# Patient Record
Sex: FEMALE | Race: OTHER | NOT HISPANIC OR LATINO | Employment: UNEMPLOYED | URBAN - METROPOLITAN AREA
[De-identification: names, ages, dates, MRNs, and addresses within clinical notes are randomized per-mention and may not be internally consistent; named-entity substitution may affect disease eponyms.]

---

## 2018-08-02 LAB
EXTERNAL ABO GROUPING: NORMAL
EXTERNAL ANTIBODY SCREEN: NEGATIVE
EXTERNAL HEPATITIS B SURFACE ANTIGEN: NEGATIVE
EXTERNAL HEPATITIS C AB: NORMAL
EXTERNAL RH FACTOR: POSITIVE
EXTERNAL RUBELLA QUALITATIVE: NORMAL
EXTERNAL SYPHILIS RPR SCREEN: NORMAL
HIV1 P24 AG SERPL QL IA: NORMAL

## 2018-12-18 LAB — EXTERNAL GTT 1 HOUR: 134

## 2019-02-19 LAB — EXTERNAL GROUP B STREP ANTIGEN: NEGATIVE

## 2019-03-08 ENCOUNTER — ANESTHESIA (OUTPATIENT)
Dept: LABOR AND DELIVERY | Facility: HOSPITAL | Age: 29
End: 2019-03-08

## 2019-03-08 ENCOUNTER — HOSPITAL ENCOUNTER (INPATIENT)
Facility: HOSPITAL | Age: 29
LOS: 3 days | Discharge: HOME | End: 2019-03-11
Attending: OBSTETRICS & GYNECOLOGY | Admitting: OBSTETRICS & GYNECOLOGY

## 2019-03-08 ENCOUNTER — ANESTHESIA EVENT (OUTPATIENT)
Dept: LABOR AND DELIVERY | Facility: HOSPITAL | Age: 29
End: 2019-03-08

## 2019-03-08 PROBLEM — Z34.90 PREGNANCY: Status: ACTIVE | Noted: 2019-03-08

## 2019-03-08 LAB
ABO GROUP BLD: NORMAL
BASOPHILS # BLD AUTO: 0.03 10*3/MM3 (ref 0–0.2)
BASOPHILS NFR BLD AUTO: 0.2 % (ref 0–1.5)
BLD GP AB SCN SERPL QL: NEGATIVE
DEPRECATED RDW RBC AUTO: 44.4 FL (ref 37–54)
EOSINOPHIL # BLD AUTO: 0.03 10*3/MM3 (ref 0–0.4)
EOSINOPHIL NFR BLD AUTO: 0.2 % (ref 0.3–6.2)
ERYTHROCYTE [DISTWIDTH] IN BLOOD BY AUTOMATED COUNT: 12 % (ref 12.3–15.4)
HCT VFR BLD AUTO: 41.2 % (ref 34–46.6)
HGB BLD-MCNC: 13.4 G/DL (ref 12–15.9)
IMM GRANULOCYTES # BLD AUTO: 0.06 10*3/MM3 (ref 0–0.05)
IMM GRANULOCYTES NFR BLD AUTO: 0.5 % (ref 0–0.5)
LYMPHOCYTES # BLD AUTO: 1.82 10*3/MM3 (ref 0.7–3.1)
LYMPHOCYTES NFR BLD AUTO: 14.1 % (ref 19.6–45.3)
MCH RBC QN AUTO: 32.8 PG (ref 26.6–33)
MCHC RBC AUTO-ENTMCNC: 32.5 G/DL (ref 31.5–35.7)
MCV RBC AUTO: 101 FL (ref 79–97)
MONOCYTES # BLD AUTO: 0.64 10*3/MM3 (ref 0.1–0.9)
MONOCYTES NFR BLD AUTO: 5 % (ref 5–12)
NEUTROPHILS # BLD AUTO: 10.32 10*3/MM3 (ref 1.4–7)
NEUTROPHILS NFR BLD AUTO: 80 % (ref 42.7–76)
NRBC BLD AUTO-RTO: 0 /100 WBC (ref 0–0)
PLATELET # BLD AUTO: 198 10*3/MM3 (ref 140–450)
PMV BLD AUTO: 10.5 FL (ref 6–12)
RBC # BLD AUTO: 4.08 10*6/MM3 (ref 3.77–5.28)
RH BLD: POSITIVE
T&S EXPIRATION DATE: NORMAL
WBC NRBC COR # BLD: 12.9 10*3/MM3 (ref 3.4–10.8)

## 2019-03-08 PROCEDURE — 86901 BLOOD TYPING SEROLOGIC RH(D): CPT | Performed by: OBSTETRICS & GYNECOLOGY

## 2019-03-08 PROCEDURE — 86850 RBC ANTIBODY SCREEN: CPT | Performed by: OBSTETRICS & GYNECOLOGY

## 2019-03-08 PROCEDURE — 85025 COMPLETE CBC W/AUTO DIFF WBC: CPT | Performed by: OBSTETRICS & GYNECOLOGY

## 2019-03-08 PROCEDURE — 86900 BLOOD TYPING SEROLOGIC ABO: CPT | Performed by: OBSTETRICS & GYNECOLOGY

## 2019-03-08 PROCEDURE — C1755 CATHETER, INTRASPINAL: HCPCS | Performed by: ANESTHESIOLOGY

## 2019-03-08 RX ORDER — LIDOCAINE HYDROCHLORIDE 10 MG/ML
INJECTION, SOLUTION INFILTRATION; PERINEURAL AS NEEDED
Status: DISCONTINUED | OUTPATIENT
Start: 2019-03-08 | End: 2019-03-09 | Stop reason: SURG

## 2019-03-08 RX ORDER — ONDANSETRON 4 MG/1
4 TABLET, FILM COATED ORAL EVERY 8 HOURS PRN
Status: DISCONTINUED | OUTPATIENT
Start: 2019-03-08 | End: 2019-03-09 | Stop reason: HOSPADM

## 2019-03-08 RX ORDER — FAMOTIDINE 10 MG/ML
20 INJECTION, SOLUTION INTRAVENOUS EVERY 12 HOURS PRN
Status: DISCONTINUED | OUTPATIENT
Start: 2019-03-08 | End: 2019-03-09 | Stop reason: HOSPADM

## 2019-03-08 RX ORDER — ONDANSETRON 2 MG/ML
4 INJECTION INTRAMUSCULAR; INTRAVENOUS EVERY 6 HOURS PRN
Status: DISCONTINUED | OUTPATIENT
Start: 2019-03-08 | End: 2019-03-09 | Stop reason: HOSPADM

## 2019-03-08 RX ORDER — ACETAMINOPHEN 325 MG/1
650 TABLET ORAL EVERY 4 HOURS PRN
Status: DISCONTINUED | OUTPATIENT
Start: 2019-03-08 | End: 2019-03-09 | Stop reason: HOSPADM

## 2019-03-08 RX ORDER — SODIUM CHLORIDE 0.9 % (FLUSH) 0.9 %
1-10 SYRINGE (ML) INJECTION AS NEEDED
Status: DISCONTINUED | OUTPATIENT
Start: 2019-03-08 | End: 2019-03-09 | Stop reason: HOSPADM

## 2019-03-08 RX ORDER — TERBUTALINE SULFATE 1 MG/ML
0.25 INJECTION, SOLUTION SUBCUTANEOUS AS NEEDED
Status: DISCONTINUED | OUTPATIENT
Start: 2019-03-08 | End: 2019-03-09 | Stop reason: HOSPADM

## 2019-03-08 RX ORDER — LIDOCAINE HYDROCHLORIDE 10 MG/ML
5 INJECTION, SOLUTION EPIDURAL; INFILTRATION; INTRACAUDAL; PERINEURAL AS NEEDED
Status: DISCONTINUED | OUTPATIENT
Start: 2019-03-08 | End: 2019-03-09 | Stop reason: HOSPADM

## 2019-03-08 RX ORDER — ONDANSETRON 2 MG/ML
4 INJECTION INTRAMUSCULAR; INTRAVENOUS ONCE AS NEEDED
Status: DISCONTINUED | OUTPATIENT
Start: 2019-03-08 | End: 2019-03-09 | Stop reason: HOSPADM

## 2019-03-08 RX ORDER — DIPHENHYDRAMINE HYDROCHLORIDE 50 MG/ML
12.5 INJECTION INTRAMUSCULAR; INTRAVENOUS EVERY 8 HOURS PRN
Status: DISCONTINUED | OUTPATIENT
Start: 2019-03-08 | End: 2019-03-09 | Stop reason: HOSPADM

## 2019-03-08 RX ORDER — ACETAMINOPHEN 500 MG
1000 TABLET ORAL ONCE
Status: DISCONTINUED | OUTPATIENT
Start: 2019-03-08 | End: 2019-03-08

## 2019-03-08 RX ORDER — PRENATAL VIT/IRON FUM/FOLIC AC 27MG-0.8MG
TABLET ORAL DAILY
COMMUNITY
End: 2020-03-10

## 2019-03-08 RX ORDER — ONDANSETRON 4 MG/1
4 TABLET, ORALLY DISINTEGRATING ORAL EVERY 6 HOURS PRN
Status: DISCONTINUED | OUTPATIENT
Start: 2019-03-08 | End: 2019-03-09 | Stop reason: HOSPADM

## 2019-03-08 RX ORDER — LIDOCAINE HYDROCHLORIDE AND EPINEPHRINE 15; 5 MG/ML; UG/ML
INJECTION, SOLUTION EPIDURAL AS NEEDED
Status: DISCONTINUED | OUTPATIENT
Start: 2019-03-08 | End: 2019-03-09 | Stop reason: SURG

## 2019-03-08 RX ORDER — ZOLPIDEM TARTRATE 5 MG/1
5 TABLET ORAL NIGHTLY PRN
Status: DISCONTINUED | OUTPATIENT
Start: 2019-03-08 | End: 2019-03-09 | Stop reason: HOSPADM

## 2019-03-08 RX ORDER — BUTORPHANOL TARTRATE 1 MG/ML
1 INJECTION, SOLUTION INTRAMUSCULAR; INTRAVENOUS
Status: DISCONTINUED | OUTPATIENT
Start: 2019-03-08 | End: 2019-03-09 | Stop reason: HOSPADM

## 2019-03-08 RX ORDER — SODIUM CHLORIDE, SODIUM LACTATE, POTASSIUM CHLORIDE, CALCIUM CHLORIDE 600; 310; 30; 20 MG/100ML; MG/100ML; MG/100ML; MG/100ML
125 INJECTION, SOLUTION INTRAVENOUS CONTINUOUS
Status: DISCONTINUED | OUTPATIENT
Start: 2019-03-08 | End: 2019-03-09

## 2019-03-08 RX ORDER — FAMOTIDINE 20 MG/1
20 TABLET, FILM COATED ORAL EVERY 12 HOURS PRN
Status: DISCONTINUED | OUTPATIENT
Start: 2019-03-08 | End: 2019-03-09 | Stop reason: HOSPADM

## 2019-03-08 RX ORDER — EPHEDRINE SULFATE 50 MG/ML
5 INJECTION, SOLUTION INTRAVENOUS AS NEEDED
Status: DISCONTINUED | OUTPATIENT
Start: 2019-03-08 | End: 2019-03-09 | Stop reason: HOSPADM

## 2019-03-08 RX ADMIN — ROPIVACAINE HYDROCHLORIDE 8 ML/HR: 2 INJECTION, SOLUTION EPIDURAL; INFILTRATION at 22:48

## 2019-03-08 RX ADMIN — SODIUM CHLORIDE, POTASSIUM CHLORIDE, SODIUM LACTATE AND CALCIUM CHLORIDE 1000 ML: 600; 310; 30; 20 INJECTION, SOLUTION INTRAVENOUS at 21:00

## 2019-03-08 RX ADMIN — LIDOCAINE HYDROCHLORIDE AND EPINEPHRINE 3 ML: 15; 5 INJECTION, SOLUTION EPIDURAL at 22:42

## 2019-03-08 RX ADMIN — SODIUM CHLORIDE, POTASSIUM CHLORIDE, SODIUM LACTATE AND CALCIUM CHLORIDE 125 ML/HR: 600; 310; 30; 20 INJECTION, SOLUTION INTRAVENOUS at 22:06

## 2019-03-08 RX ADMIN — LIDOCAINE HYDROCHLORIDE 4 ML: 10 INJECTION, SOLUTION INFILTRATION; PERINEURAL at 22:46

## 2019-03-08 NOTE — NURSING NOTE
Dr. Figueroa given update on patient's status. Small change in effacement patient rates pain 8/10. Bulging bag of water. Pt will walk around unit and is to be rechecked In a few hours.

## 2019-03-08 NOTE — NURSING NOTE
Spoke to Dr. Figueroa about patient. Strip reviewed, patient alize irregularly and rating pain 3/10. Bloody show noted on glove after SVE. SVE 1/70/-2 and posterior.  MD states to recheck patient in hour and to reassess.

## 2019-03-09 LAB
ATMOSPHERIC PRESS: 751.1 MMHG
BASE EXCESS BLDCOV CALC-SCNC: -3.8 MMOL/L (ref -30–30)
BDY SITE: ABNORMAL
HCO3 BLDCOV-SCNC: 21.8 MMOL/L
MODALITY: ABNORMAL
NOTE: ABNORMAL
PCO2 BLDCOV: 40.7 MM HG (ref 35–51.3)
PH BLDCOV: 7.34 PH UNITS (ref 7.26–7.4)
PO2 BLDCOV: 30 MM HG (ref 19–39)
SAO2 % BLDCOA: 53.4 % (ref 92–99)
SAO2 % BLDCOV: ABNORMAL %

## 2019-03-09 PROCEDURE — 0KQM0ZZ REPAIR PERINEUM MUSCLE, OPEN APPROACH: ICD-10-PCS | Performed by: OBSTETRICS & GYNECOLOGY

## 2019-03-09 PROCEDURE — 82803 BLOOD GASES ANY COMBINATION: CPT

## 2019-03-09 PROCEDURE — 25010000002 ROPIVACAINE PER 1 MG: Performed by: ANESTHESIOLOGY

## 2019-03-09 PROCEDURE — C1755 CATHETER, INTRASPINAL: HCPCS

## 2019-03-09 PROCEDURE — 25010000002 FENTANYL CITRATE (PF) 2500 MCG/50ML SOLUTION: Performed by: ANESTHESIOLOGY

## 2019-03-09 RX ORDER — ONDANSETRON 4 MG/1
4 TABLET, FILM COATED ORAL EVERY 8 HOURS PRN
Status: DISCONTINUED | OUTPATIENT
Start: 2019-03-09 | End: 2019-03-11 | Stop reason: HOSPADM

## 2019-03-09 RX ORDER — METHYLERGONOVINE MALEATE 0.2 MG/ML
200 INJECTION INTRAVENOUS ONCE AS NEEDED
Status: DISCONTINUED | OUTPATIENT
Start: 2019-03-09 | End: 2019-03-09 | Stop reason: HOSPADM

## 2019-03-09 RX ORDER — MISOPROSTOL 200 UG/1
600 TABLET ORAL ONCE AS NEEDED
Status: DISCONTINUED | OUTPATIENT
Start: 2019-03-09 | End: 2019-03-11 | Stop reason: HOSPADM

## 2019-03-09 RX ORDER — MINERAL OIL
OIL (ML) MISCELLANEOUS AS NEEDED
Status: DISCONTINUED | OUTPATIENT
Start: 2019-03-09 | End: 2019-03-11 | Stop reason: HOSPADM

## 2019-03-09 RX ORDER — CARBOPROST TROMETHAMINE 250 UG/ML
250 INJECTION, SOLUTION INTRAMUSCULAR AS NEEDED
Status: DISCONTINUED | OUTPATIENT
Start: 2019-03-09 | End: 2019-03-09 | Stop reason: HOSPADM

## 2019-03-09 RX ORDER — PHYTONADIONE 1 MG/.5ML
INJECTION, EMULSION INTRAMUSCULAR; INTRAVENOUS; SUBCUTANEOUS
Status: DISCONTINUED
Start: 2019-03-09 | End: 2019-03-09 | Stop reason: WASHOUT

## 2019-03-09 RX ORDER — ONDANSETRON 4 MG/1
4 TABLET, ORALLY DISINTEGRATING ORAL EVERY 6 HOURS PRN
Status: DISCONTINUED | OUTPATIENT
Start: 2019-03-09 | End: 2019-03-11 | Stop reason: HOSPADM

## 2019-03-09 RX ORDER — PRENATAL VIT NO.126/IRON/FOLIC 28MG-0.8MG
1 TABLET ORAL DAILY
Status: DISCONTINUED | OUTPATIENT
Start: 2019-03-09 | End: 2019-03-11 | Stop reason: HOSPADM

## 2019-03-09 RX ORDER — OXYCODONE AND ACETAMINOPHEN 10; 325 MG/1; MG/1
1 TABLET ORAL EVERY 4 HOURS PRN
Status: DISCONTINUED | OUTPATIENT
Start: 2019-03-09 | End: 2019-03-11 | Stop reason: HOSPADM

## 2019-03-09 RX ORDER — ONDANSETRON 2 MG/ML
4 INJECTION INTRAMUSCULAR; INTRAVENOUS EVERY 6 HOURS PRN
Status: DISCONTINUED | OUTPATIENT
Start: 2019-03-09 | End: 2019-03-11 | Stop reason: HOSPADM

## 2019-03-09 RX ORDER — ONDANSETRON 4 MG/1
4 TABLET, ORALLY DISINTEGRATING ORAL EVERY 8 HOURS PRN
Status: DISCONTINUED | OUTPATIENT
Start: 2019-03-09 | End: 2019-03-11 | Stop reason: HOSPADM

## 2019-03-09 RX ORDER — ACETAMINOPHEN 650 MG/1
650 SUPPOSITORY RECTAL ONCE
Status: COMPLETED | OUTPATIENT
Start: 2019-03-09 | End: 2019-03-09

## 2019-03-09 RX ORDER — IBUPROFEN 800 MG/1
800 TABLET ORAL EVERY 8 HOURS PRN
Status: DISCONTINUED | OUTPATIENT
Start: 2019-03-09 | End: 2019-03-11 | Stop reason: HOSPADM

## 2019-03-09 RX ORDER — PROMETHAZINE HYDROCHLORIDE 25 MG/ML
12.5 INJECTION, SOLUTION INTRAMUSCULAR; INTRAVENOUS EVERY 6 HOURS PRN
Status: DISCONTINUED | OUTPATIENT
Start: 2019-03-09 | End: 2019-03-11 | Stop reason: HOSPADM

## 2019-03-09 RX ORDER — ERYTHROMYCIN 5 MG/G
OINTMENT OPHTHALMIC
Status: DISPENSED
Start: 2019-03-09 | End: 2019-03-09

## 2019-03-09 RX ORDER — OXYCODONE HYDROCHLORIDE AND ACETAMINOPHEN 5; 325 MG/1; MG/1
2 TABLET ORAL EVERY 4 HOURS PRN
Status: DISCONTINUED | OUTPATIENT
Start: 2019-03-09 | End: 2019-03-11 | Stop reason: HOSPADM

## 2019-03-09 RX ORDER — ACETAMINOPHEN 650 MG/1
325 SUPPOSITORY RECTAL ONCE
Status: COMPLETED | OUTPATIENT
Start: 2019-03-09 | End: 2019-03-09

## 2019-03-09 RX ORDER — IBUPROFEN 800 MG/1
800 TABLET ORAL EVERY 8 HOURS PRN
Status: DISCONTINUED | OUTPATIENT
Start: 2019-03-09 | End: 2019-03-09 | Stop reason: HOSPADM

## 2019-03-09 RX ORDER — ONDANSETRON 2 MG/ML
4 INJECTION INTRAMUSCULAR; INTRAVENOUS EVERY 8 HOURS PRN
Status: DISCONTINUED | OUTPATIENT
Start: 2019-03-09 | End: 2019-03-11 | Stop reason: HOSPADM

## 2019-03-09 RX ORDER — HYDROMORPHONE HYDROCHLORIDE 1 MG/ML
0.5 INJECTION, SOLUTION INTRAMUSCULAR; INTRAVENOUS; SUBCUTANEOUS
Status: DISCONTINUED | OUTPATIENT
Start: 2019-03-09 | End: 2019-03-11 | Stop reason: HOSPADM

## 2019-03-09 RX ORDER — OXYCODONE HYDROCHLORIDE AND ACETAMINOPHEN 5; 325 MG/1; MG/1
1 TABLET ORAL EVERY 4 HOURS PRN
Status: DISCONTINUED | OUTPATIENT
Start: 2019-03-09 | End: 2019-03-11 | Stop reason: HOSPADM

## 2019-03-09 RX ORDER — CALCIUM CARBONATE 200(500)MG
2 TABLET,CHEWABLE ORAL 3 TIMES DAILY PRN
Status: DISCONTINUED | OUTPATIENT
Start: 2019-03-09 | End: 2019-03-11 | Stop reason: HOSPADM

## 2019-03-09 RX ORDER — OXYTOCIN-SODIUM CHLORIDE 0.9% IV SOLN 30 UNIT/500ML 30-0.9/5 UT/ML-%
250 SOLUTION INTRAVENOUS CONTINUOUS
Status: ACTIVE | OUTPATIENT
Start: 2019-03-09 | End: 2019-03-09

## 2019-03-09 RX ORDER — PROMETHAZINE HYDROCHLORIDE 25 MG/1
12.5 SUPPOSITORY RECTAL EVERY 6 HOURS PRN
Status: DISCONTINUED | OUTPATIENT
Start: 2019-03-09 | End: 2019-03-11 | Stop reason: HOSPADM

## 2019-03-09 RX ORDER — LANOLIN
CREAM (ML) TOPICAL
Status: DISCONTINUED | OUTPATIENT
Start: 2019-03-09 | End: 2019-03-11 | Stop reason: HOSPADM

## 2019-03-09 RX ORDER — MISOPROSTOL 200 UG/1
800 TABLET ORAL AS NEEDED
Status: DISCONTINUED | OUTPATIENT
Start: 2019-03-09 | End: 2019-03-09 | Stop reason: HOSPADM

## 2019-03-09 RX ORDER — PROMETHAZINE HYDROCHLORIDE 25 MG/1
25 TABLET ORAL EVERY 6 HOURS PRN
Status: DISCONTINUED | OUTPATIENT
Start: 2019-03-09 | End: 2019-03-11 | Stop reason: HOSPADM

## 2019-03-09 RX ORDER — PHYTONADIONE 1 MG/.5ML
INJECTION, EMULSION INTRAMUSCULAR; INTRAVENOUS; SUBCUTANEOUS
Status: DISPENSED
Start: 2019-03-09 | End: 2019-03-09

## 2019-03-09 RX ORDER — OXYTOCIN-SODIUM CHLORIDE 0.9% IV SOLN 30 UNIT/500ML 30-0.9/5 UT/ML-%
999 SOLUTION INTRAVENOUS ONCE
Status: COMPLETED | OUTPATIENT
Start: 2019-03-09 | End: 2019-03-09

## 2019-03-09 RX ORDER — ERYTHROMYCIN 5 MG/G
OINTMENT OPHTHALMIC
Status: DISCONTINUED
Start: 2019-03-09 | End: 2019-03-09 | Stop reason: WASHOUT

## 2019-03-09 RX ORDER — OXYTOCIN-SODIUM CHLORIDE 0.9% IV SOLN 30 UNIT/500ML 30-0.9/5 UT/ML-%
125 SOLUTION INTRAVENOUS CONTINUOUS PRN
Status: COMPLETED | OUTPATIENT
Start: 2019-03-09 | End: 2019-03-09

## 2019-03-09 RX ORDER — DOCUSATE SODIUM 100 MG/1
100 CAPSULE, LIQUID FILLED ORAL 2 TIMES DAILY
Status: DISCONTINUED | OUTPATIENT
Start: 2019-03-09 | End: 2019-03-11 | Stop reason: HOSPADM

## 2019-03-09 RX ADMIN — SODIUM CHLORIDE, POTASSIUM CHLORIDE, SODIUM LACTATE AND CALCIUM CHLORIDE 125 ML/HR: 600; 310; 30; 20 INJECTION, SOLUTION INTRAVENOUS at 02:20

## 2019-03-09 RX ADMIN — OXYTOCIN 125 ML/HR: 10 INJECTION INTRAVENOUS at 11:36

## 2019-03-09 RX ADMIN — ACETAMINOPHEN 650 MG: 650 SUPPOSITORY RECTAL at 08:18

## 2019-03-09 RX ADMIN — OXYTOCIN 999 ML/HR: 10 INJECTION INTRAVENOUS at 10:09

## 2019-03-09 RX ADMIN — ROPIVACAINE HYDROCHLORIDE 8 ML/HR: 2 INJECTION, SOLUTION EPIDURAL; INFILTRATION at 07:03

## 2019-03-09 RX ADMIN — ACETAMINOPHEN 325 MG: 650 SUPPOSITORY RECTAL at 08:19

## 2019-03-09 RX ADMIN — Medication: at 14:47

## 2019-03-09 RX ADMIN — IBUPROFEN 800 MG: 800 TABLET, FILM COATED ORAL at 14:47

## 2019-03-09 RX ADMIN — OXYCODONE AND ACETAMINOPHEN 2 TABLET: 5; 325 TABLET ORAL at 23:28

## 2019-03-09 RX ADMIN — DOCUSATE SODIUM 100 MG: 100 CAPSULE, LIQUID FILLED ORAL at 19:49

## 2019-03-09 RX ADMIN — SODIUM CHLORIDE, POTASSIUM CHLORIDE, SODIUM LACTATE AND CALCIUM CHLORIDE 125 ML/HR: 600; 310; 30; 20 INJECTION, SOLUTION INTRAVENOUS at 09:22

## 2019-03-09 RX ADMIN — OXYCODONE AND ACETAMINOPHEN 1 TABLET: 5; 325 TABLET ORAL at 19:49

## 2019-03-09 RX ADMIN — IBUPROFEN 800 MG: 800 TABLET, FILM COATED ORAL at 23:28

## 2019-03-09 NOTE — LACTATION NOTE
P1. Term infant.  Pt anxious about holding infant and feels most comfortable in cradle hold.  Infant eager and grasps breast easily.  Reviewed difference between shallow and deep latch and instructed pt to relatch infant when shallow latch achieved.  Reviewed how to know infant getting enough, diet, and feeding patterns.  Pt to call LC as needed.    Lactation Consult Note    Evaluation Completed: 3/9/2019 3:43 PM  Patient Name: Kamron Delgado  :  1990  MRN:  [id number]     REFERRAL  INFORMATION:                          Date of Referral: 19   Person Making Referral: patient  Maternal Reason for Referral: breastfeeding currently       DELIVERY HISTORY:          Skin to skin initiation date/time: 3/9/2019  10:14 AM   Skin to skin end date/time:              MATERNAL ASSESSMENT:  Breast Size Issue: none (19 : Yaz Barrios, RN)  Breast Shape: Bilateral:, round (19 : Yaz Barrios, RN)  Breast Density: Bilateral:, soft (19 : Yaz Barrios, RN)  Areola: Bilateral:, elastic (19 : Yaz Barrios, RN)  Nipples: Bilateral:, everted (19 : Yaz Barrios, RN)                INFANT ASSESSMENT:  Information for the patient's :  Ross Delgado [[id number]]   No past medical history on file.    Feeding Readiness Cues: eager (19 : Yaz Barrios, RN)   Feeding Method: breastfeeding (19 : Yaz Barrios, RN)   Feeding Tolerance/Success: coordinated suck, coordinated swallow, adequate pause for breath, alert for feeding (19 : Yaz Barrios, RN)       Satiety Cues: calm after feeding (19 : Yaz Barrios, RN)           Feeding Interventions: lips stroked, latch assistance provided (19 : Yaz Barrios, RN)   Nutrition Interventions: lactation consult initiated (19 : Yaz Barrios, RN)   Additional Documentation: LATCH Score (Group) (19 : Yaz Barrios, RN)            Breastfeeding: breastfeeding, bilateral (19 : Yaz Barrios RN)   Infant Positioning: cradle (19 : Yaz Barrios, RN)   Breastfeeding Time, Left (min): 5 (19 : Yaz Barrios RN)       Effective Latch During Feeding: yes (19 : Yaz Barrios RN)   Suck/Swallow Coordination: present (19 : Yaz Barrios RN)   Signs of Milk Transfer: infant jaw motion present (19 : Yaz Barrios RN)       Latch: 2-->grasps breast, tongue down, lips flanged, rhythmic sucking (19 : Yaz Barrios RN)   Audible Swallowin-->a few with stimulation (19 : Yaz Barrios RN)   Type of Nipple: 2-->everted (after stimulation) (19 : Yaz Barrios RN)   Comfort (Breast/Nipple): 2-->soft/nontender (19 : Yaz Barrios, RN)   Hold (Positioning): 1-->minimal assist, teach one side, mother does other, staff holds (19 : Yaz Barrios, RN)   Latch Score: 8 (19 : Yaz Barrios, RN)     Infant-Driven Feeding Scales - Readiness: Alert or fussy prior to care. Rooting and/or hands to mouth behavior. Good tone. (19 : Yaz Barrios, RN)   Infant-Driven Feeding Scales - Quality: Nipples with a strong coordinated SSB but fatigues with progression. (19 : Yaz Barrios, SHARON)             MATERNAL INFANT FEEDING:  Maternal Preparation: breast care (19 : Yaz Barrios, SHARON)  Maternal Emotional State: anxious (19 : Yaz Barrios, RN)  Infant Positioning: cradle (19 : Yaz Barrios, RN)   Signs of Milk Transfer: infant jaw motion present (19 : Yaz Barrios, RN)  Pain with Feeding: no (19 : Yaz Barrios, RN)        Comfort Measures Before/During Feeding: infant position adjusted, latch adjusted (19 : Yaz Barrios, RN)  Milk Ejection Reflex: present (19 : Yaz Barrios, SHARON)  Comfort Measures Following Feeding:  expressed milk applied, air-drying encouraged (03/09/19 1539 : Yaz Barrios, RN)        Latch Assistance: yes (03/09/19 1539 : Yaz Barrios, RN)                               EQUIPMENT TYPE:             Breast Care: Breastfeeding: breast milk to nipples, milk massaged towards nipple, open to air (03/09/19 1540 : Yaz Barrios, RN)  Breastfeeding Assistance: assisted with positioning, feeding on demand promoted, infant latch-on verified, support offered, infant stimulated to wakeful state, feeding session observed, feeding cue recognition promoted (03/09/19 1540 : Yaz Barrios, RN)     Breastfeeding Support: encouragement provided, lactation counseling provided, maternal nutrition promoted, maternal rest encouraged, maternal hydration promoted, infant-mother separation minimized (03/09/19 1540 : Yaz Barrios, RN)          BREAST PUMPING:          LACTATION REFERRALS:

## 2019-03-09 NOTE — PLAN OF CARE
Problem: Patient Care Overview  Goal: Plan of Care Review  Outcome: Ongoing (interventions implemented as appropriate)   19   Coping/Psychosocial   Plan of Care Reviewed With patient;spouse;other (see comments)  (mother-in-law)   Plan of Care Review   Progress no change     Goal: Individualization and Mutuality  Outcome: Ongoing (interventions implemented as appropriate)   19   Individualization   Patient Specific Preferences KRISHNA and BF in OBRR   Patient Specific Goals (Include Timeframe) vaginal delivery   Patient Specific Interventions pt wants to be kept informed of progress, interventions, does not want any surprises   Mutuality/Individual Preferences   What Anxieties, Fears, Concerns, or Questions Do You Have About Your Care?  section   Mutuality/Individual Preferences   How to Address Anxieties/Fears keep informed of progress and anticipated needs/changes to plan of care`     Goal: Discharge Needs Assessment  Outcome: Ongoing (interventions implemented as appropriate)   19   Discharge Needs Assessment   Concerns to be Addressed no discharge needs identified     Goal: Interprofessional Rounds/Family Conf  Outcome: Ongoing (interventions implemented as appropriate)   19   Interdisciplinary Rounds/Family Conf   Summary admitted for labor, epidural placement completed, pt states pain is decreasing. FHR stable, contractions irregular with coupling. Spouse at side/supportive   Participants nursing;patient;pharmacy;physician       Problem: Labor (Cervical Ripen, Induct, Augment) (Adult,Obstetrics,Pediatric)  Goal: Signs and Symptoms of Listed Potential Problems Will be Absent, Minimized or Managed (Labor)  Outcome: Ongoing (interventions implemented as appropriate)   19   Goal/Outcome Evaluation   Problems Assessed (Labor) all   Problems Present (Labor) pain

## 2019-03-09 NOTE — ANESTHESIA PROCEDURE NOTES
Labor Epidural      Patient reassessed immediately prior to procedure    Patient location during procedure: OB  Performed By  Anesthesiologist: Bertrand Lynn MD  Preanesthetic Checklist  Completed: patient identified, site marked, surgical consent, pre-op evaluation, timeout performed, IV checked, risks and benefits discussed and monitors and equipment checked  Additional Notes  Test dose 3 cc 1.5% lidocaine with epi.  Second dose 5cc 1% lidocaine then continuous infusion o.2% Ropivicaine with 2 Mics fentanyl per cc at 8 cc hr, with  4cc PCA, 15 min lockout  Prep:  Pt Position:left lateral decubitus  Sterile Tech:gloves, cap, mask and sterile barrier  Prep:chlorhexidine gluconate and isopropyl alcohol  Monitoring:blood pressure monitoring, continuous pulse oximetry and EKG  Epidural Block Procedure:  Approach:midline  Guidance:landmark technique  Location:L4-L5  Needle Type:Tuohy  Needle Gauge:18  Loss of Resistance Medium: air  Paresthesia: none  Aspiration:negative  Test Dose:negative  Number of Attempts: 1  Post Assessment:  Dressing:occlusive dressing applied and secured with tape  Pt Tolerance:patient tolerated the procedure well with no apparent complications  Complications:no

## 2019-03-09 NOTE — PLAN OF CARE
Problem: Patient Care Overview  Goal: Plan of Care Review  Outcome: Ongoing (interventions implemented as appropriate)   19   Coping/Psychosocial   Plan of Care Reviewed With patient   Plan of Care Review   Progress improving     Goal: Individualization and Mutuality  Outcome: Ongoing (interventions implemented as appropriate)   19   Individualization   Patient Specific Preferences KRISHNA and BF in OBRR --    Patient Specific Goals (Include Timeframe) vaginal delivery --    Patient Specific Interventions pt wants to be kept informed of progress, interventions, does not want any surprises --    Mutuality/Individual Preferences   What Anxieties, Fears, Concerns, or Questions Do You Have About Your Care?  section --    What Information Would Help Us Give You More Personalized Care? --  keep informed and educated   How Would You and/or Your Support Person Like to Participate in Your Care? --  spouse to remain at bedside and involved in care   Mutuality/Individual Preferences   How to Address Anxieties/Fears keep informed of progress and anticipated needs/changes to plan of care` --      Goal: Discharge Needs Assessment  Outcome: Ongoing (interventions implemented as appropriate)   19   Discharge Needs Assessment   Readmission Within the Last 30 Days no previous admission in last 30 days   Concerns to be Addressed no discharge needs identified   Patient/Family Anticipates Transition to home;home with family   Patient/Family Anticipated Services at Transition none   Transportation Concerns car, none   Transportation Anticipated car, drives self;family or friend will provide   Anticipated Changes Related to Illness none   Equipment Needed After Discharge none   Offered/Gave Vendor List no   Disability   Equipment Currently Used at Home none       Problem: Labor (Cervical Ripen, Induct, Augment) (Adult,Obstetrics,Pediatric)  Goal: Signs and Symptoms of Listed Potential  Problems Will be Absent, Minimized or Managed (Labor)  Outcome: Ongoing (interventions implemented as appropriate)   19   Goal/Outcome Evaluation   Problems Assessed (Labor) all   Problems Present (Labor) none       Problem: Skin Injury Risk (Adult)  Goal: Identify Related Risk Factors and Signs and Symptoms  Outcome: Ongoing (interventions implemented as appropriate)   19   Skin Injury Risk (Adult)   Related Risk Factors (Skin Injury Risk) medical devices;medication;mobility impaired     Goal: Skin Health and Integrity  Outcome: Ongoing (interventions implemented as appropriate)   19   Skin Injury Risk (Adult)   Skin Health and Integrity making progress toward outcome       Problem: Anesthesia/Analgesia, Neuraxial (Obstetrics)  Goal: Signs and Symptoms of Listed Potential Problems Will be Absent, Minimized or Managed (Anesthesia/Analgesia, Neuraxial)  Outcome: Ongoing (interventions implemented as appropriate)   19   Goal/Outcome Evaluation   Problems Assessed (Neuraxial Anesthesia/Analgesia, OB) all   Problems Present (Neuraxial Anesth OB) none       Problem: Fall Risk,  (Adult,Obstetrics,Pediatric)  Goal: Identify Related Risk Factors and Signs and Symptoms  Outcome: Ongoing (interventions implemented as appropriate)   19   Fall Risk,  (Adult,Obstetrics,Pediatric)   Related Risk Factors (Fall Risk, ) fatigue;medical devices;medication side effects;prolonged bed rest;regional anesthesia   Signs and Symptoms (Fall Risk, ) presence of fall risk factors;increased risk of  fall/drop     Goal: Absence of Maternal Fall  Outcome: Ongoing (interventions implemented as appropriate)   19   Fall Risk,  (Adult,Obstetrics,Pediatric)   Absence of Maternal Fall making progress toward outcome     Goal: Absence of [place] Fall/Drop  Outcome: Ongoing (interventions implemented as appropriate)   19   Fall  Risk,  (Adult,Obstetrics,Pediatric)   Absence of [place] Fall/Drop making progress toward outcome

## 2019-03-09 NOTE — L&D DELIVERY NOTE
[facility]  Vaginal Delivery Note    Patient Name: Kamron Delgado  :  1990  MRN:  [id number]      Delivery     Delivery: Vaginal, Spontaneous     YOB: 2017    Time of Birth: 10:06 AM      Anesthesia: Epidural     Delivering clinician: Suleman Whitaker MD       Infant    Findings: Viable female  infant    Infant observations: Weight: 3078 g (6 lb 12.6 oz)        Apgars: 8   @ 1 minute /    9   @ 5 minutes     Placenta, Cord, and Fluid    Placenta delivered  Spontaneous   at  3/9/2019 10:09 AM     Cord: 3 vessels  present.   Cord blood obtained: Yes          Repair    Episiotomy: No   Lacerations: 2nd degree   Estimated Blood Loss: 350  mls.       Delivery narrative: Kamron Delgado is a 28 y.o.  at 38w6d.  Presented in early labor and had spontaneous progress. Received epidural anesthesia.  Had  spontaneous rupture of membranes  at 11:25 PM  on 3/8/2019   Progressed normally to C/C/+1 @ 3/9/2019  7:21 AM . Labored down until 3/9/2019  8:58 AM . Fetal status reassuring throughout.  of viable  female  @ 10:06 AM  over an intact perineum. ASDE. Delayed cord clamping performed and infant placed in kangaroo care.   3078 g (6 lb 12.6 oz) .     Apgar :  8   @ 1 minute / 9   @ 5 minutes assigned by  NP who was present for thin meconium.  Placenta delivered spontaneously, intact with 3 vessel cord. Cervix and rectum intact. 2nd degree laceration repaired in usual fashion with 3-0 suture. Mother and baby recovering good condition.       Suleman Whitaker MD  19  10:58 AM

## 2019-03-09 NOTE — PLAN OF CARE
Problem: Patient Care Overview  Goal: Plan of Care Review  Outcome: Ongoing (interventions implemented as appropriate)   03/09/19 1030   Coping/Psychosocial   Plan of Care Reviewed With patient;spouse   Plan of Care Review   Progress improving   OTHER   Outcome Summary s/p vaginal delivery     Goal: Individualization and Mutuality  Outcome: Ongoing (interventions implemented as appropriate)   03/08/19 2300 03/09/19 0414 03/09/19 1030   Individualization   Patient Specific Preferences --  --  KRISHNA, breastfeed   Patient Specific Goals (Include Timeframe) --  --  healthy baby, pain management   Patient Specific Interventions pt wants to be kept informed of progress, interventions, does not want any surprises --  --    Mutuality/Individual Preferences   What Anxieties, Fears, Concerns, or Questions Do You Have About Your Care? --  --  denies   What Information Would Help Us Give You More Personalized Care? --  keep informed and educated --    How Would You and/or Your Support Person Like to Participate in Your Care? --  spouse to remain at bedside and involved in care --    Mutuality/Individual Preferences   How to Address Anxieties/Fears keep informed of progress and anticipated needs/changes to plan of care` --  --      Goal: Discharge Needs Assessment  Outcome: Ongoing (interventions implemented as appropriate)   03/09/19 0414   Discharge Needs Assessment   Readmission Within the Last 30 Days no previous admission in last 30 days   Concerns to be Addressed no discharge needs identified   Patient/Family Anticipates Transition to home;home with family   Patient/Family Anticipated Services at Transition none   Transportation Concerns car, none   Transportation Anticipated car, drives self;family or friend will provide   Anticipated Changes Related to Illness none   Equipment Needed After Discharge none   Offered/Gave Vendor List no   Disability   Equipment Currently Used at Home none     Goal: Interprofessional  Rounds/Family Conf  Outcome: Ongoing (interventions implemented as appropriate)   19 1030   Interdisciplinary Rounds/Family Conf   Summary s/p vaginal delivery   Participants physician;nursing;patient       Problem: Labor (Cervical Ripen, Induct, Augment) (Adult,Obstetrics,Pediatric)  Goal: Signs and Symptoms of Listed Potential Problems Will be Absent, Minimized or Managed (Labor)  Outcome: Outcome(s) achieved Date Met: 19 1030   Goal/Outcome Evaluation   Problems Assessed (Labor) all   Problems Present (Labor) none       Problem: Skin Injury Risk (Adult)  Goal: Identify Related Risk Factors and Signs and Symptoms  Outcome: Ongoing (interventions implemented as appropriate)   19 0414   Skin Injury Risk (Adult)   Related Risk Factors (Skin Injury Risk) medical devices;medication;mobility impaired     Goal: Skin Health and Integrity  Outcome: Ongoing (interventions implemented as appropriate)   19 1030   Skin Injury Risk (Adult)   Skin Health and Integrity achieves outcome       Problem: Anesthesia/Analgesia, Neuraxial (Obstetrics)  Goal: Signs and Symptoms of Listed Potential Problems Will be Absent, Minimized or Managed (Anesthesia/Analgesia, Neuraxial)  Outcome: Ongoing (interventions implemented as appropriate)   19 1030   Goal/Outcome Evaluation   Problems Assessed (Neuraxial Anesthesia/Analgesia, OB) all   Problems Present (Neuraxial Anesth OB) none       Problem: Fall Risk,  (Adult,Obstetrics,Pediatric)  Goal: Identify Related Risk Factors and Signs and Symptoms  Outcome: Ongoing (interventions implemented as appropriate)   19 0414   Fall Risk,  (Adult,Obstetrics,Pediatric)   Related Risk Factors (Fall Risk, ) fatigue;medical devices;medication side effects;prolonged bed rest;regional anesthesia   Signs and Symptoms (Fall Risk, ) presence of fall risk factors;increased risk of  fall/drop     Goal: Absence of Maternal  Fall  Outcome: Ongoing (interventions implemented as appropriate)   19 1030   Fall Risk,  (Adult,Obstetrics,Pediatric)   Absence of Maternal Fall achieves outcome     Goal: Absence of  Fall/Drop  Outcome: Ongoing (interventions implemented as appropriate)   19 1030   Fall Risk,  (Adult,Obstetrics,Pediatric)   Absence of [place] Fall/Drop achieves outcome

## 2019-03-09 NOTE — ANESTHESIA PREPROCEDURE EVALUATION
Anesthesia Evaluation     history of anesthetic complications:               Airway   Mallampati: II  Dental      Pulmonary    Cardiovascular         Neuro/Psych  GI/Hepatic/Renal/Endo      Musculoskeletal     Abdominal    Substance History      OB/GYN    (+) Pregnant,         Other                      Anesthesia Plan    ASA 2     epidural     Anesthetic plan, all risks, benefits, and alternatives have been provided, discussed and informed consent has been obtained with: patient and spouse/significant other.

## 2019-03-10 LAB
BASOPHILS # BLD AUTO: 0.05 10*3/MM3 (ref 0–0.2)
BASOPHILS NFR BLD AUTO: 0.3 % (ref 0–1.5)
DEPRECATED RDW RBC AUTO: 47.1 FL (ref 37–54)
EOSINOPHIL # BLD AUTO: 0.16 10*3/MM3 (ref 0–0.4)
EOSINOPHIL NFR BLD AUTO: 1.1 % (ref 0.3–6.2)
ERYTHROCYTE [DISTWIDTH] IN BLOOD BY AUTOMATED COUNT: 12.3 % (ref 12.3–15.4)
HCT VFR BLD AUTO: 29.9 % (ref 34–46.6)
HGB BLD-MCNC: 9.5 G/DL (ref 12–15.9)
IMM GRANULOCYTES # BLD AUTO: 0.06 10*3/MM3 (ref 0–0.05)
IMM GRANULOCYTES NFR BLD AUTO: 0.4 % (ref 0–0.5)
LYMPHOCYTES # BLD AUTO: 2.44 10*3/MM3 (ref 0.7–3.1)
LYMPHOCYTES NFR BLD AUTO: 16.3 % (ref 19.6–45.3)
MCH RBC QN AUTO: 33.2 PG (ref 26.6–33)
MCHC RBC AUTO-ENTMCNC: 31.8 G/DL (ref 31.5–35.7)
MCV RBC AUTO: 104.5 FL (ref 79–97)
MONOCYTES # BLD AUTO: 0.91 10*3/MM3 (ref 0.1–0.9)
MONOCYTES NFR BLD AUTO: 6.1 % (ref 5–12)
NEUTROPHILS # BLD AUTO: 11.38 10*3/MM3 (ref 1.4–7)
NEUTROPHILS NFR BLD AUTO: 75.8 % (ref 42.7–76)
NRBC BLD AUTO-RTO: 0 /100 WBC (ref 0–0)
PLATELET # BLD AUTO: 141 10*3/MM3 (ref 140–450)
PMV BLD AUTO: 10.8 FL (ref 6–12)
RBC # BLD AUTO: 2.86 10*6/MM3 (ref 3.77–5.28)
WBC NRBC COR # BLD: 15 10*3/MM3 (ref 3.4–10.8)

## 2019-03-10 PROCEDURE — 85025 COMPLETE CBC W/AUTO DIFF WBC: CPT | Performed by: OBSTETRICS & GYNECOLOGY

## 2019-03-10 RX ADMIN — IBUPROFEN 800 MG: 800 TABLET, FILM COATED ORAL at 15:17

## 2019-03-10 RX ADMIN — Medication 1 TABLET: at 08:05

## 2019-03-10 RX ADMIN — IBUPROFEN 800 MG: 800 TABLET, FILM COATED ORAL at 22:22

## 2019-03-10 RX ADMIN — OXYCODONE AND ACETAMINOPHEN 1 TABLET: 5; 325 TABLET ORAL at 15:17

## 2019-03-10 RX ADMIN — OXYCODONE AND ACETAMINOPHEN 1 TABLET: 5; 325 TABLET ORAL at 08:06

## 2019-03-10 RX ADMIN — IBUPROFEN 800 MG: 800 TABLET, FILM COATED ORAL at 08:06

## 2019-03-10 RX ADMIN — OXYCODONE AND ACETAMINOPHEN 1 TABLET: 5; 325 TABLET ORAL at 20:23

## 2019-03-10 RX ADMIN — DOCUSATE SODIUM 100 MG: 100 CAPSULE, LIQUID FILLED ORAL at 22:22

## 2019-03-10 RX ADMIN — DOCUSATE SODIUM 100 MG: 100 CAPSULE, LIQUID FILLED ORAL at 08:06

## 2019-03-10 NOTE — PROGRESS NOTES
[facility]  Vaginal Delivery Progress Note    Patient Name: Kamron Delgado  :  1990  MRN:  [id number]      Subjective   Postpartum Day 1: Vaginal Delivery of a female infant.     The patient feels well without complaints.  Her pain is well controlled.  Reports normal lochia.     The patient plans to breastfeed.    Objective     Vital Signs Range for the last 24 hours  Temperature: Temp:  [97.2 °F (36.2 °C)-98.9 °F (37.2 °C)] 98 °F (36.7 °C)       BP: BP: ()/(68-87) 102/70   Pulse: Heart Rate:  [] 83   Respirations: Resp:  [16-20] 16                       Physical Exam:  General: Awake and alert  Abdomen: Fundus: firm, non tender, U-2 below umbilicus  Extremities:  trace edema, NT     Labs:     Results from last 7 days   Lab Units 03/10/19  0619 19  2105   WBC 10*3/mm3 15.00* 12.90*   HEMOGLOBIN g/dL 9.5* 13.4   HEMATOCRIT % 29.9* 41.2   PLATELETS 10*3/mm3 141 198       Prenatal labs results reviewed:  Yes   Rubella:  immune  Rh Status:    RH type   Date Value Ref Range Status   2019 Positive  Final         Assessment/Plan  : 1. PPD1 S/P  - Doing well, continue usual cares.           Pregnancy          Venessa Figueroa MD  3/10/2019  10:11 AM

## 2019-03-10 NOTE — PLAN OF CARE
Problem: Patient Care Overview  Goal: Plan of Care Review  Outcome: Ongoing (interventions implemented as appropriate)   03/10/19 1534   Coping/Psychosocial   Plan of Care Reviewed With patient   Plan of Care Review   Progress improving   OTHER   Outcome Summary doing well, pain well controlled with po meds, breastfeeding and supplementing. home tomorrow.      Goal: Individualization and Mutuality  Outcome: Ongoing (interventions implemented as appropriate)   03/10/19 1534   Individualization   Patient Specific Preferences breastfeeding     Goal: Discharge Needs Assessment  Outcome: Ongoing (interventions implemented as appropriate)   03/09/19 0414   Discharge Needs Assessment   Readmission Within the Last 30 Days no previous admission in last 30 days   Concerns to be Addressed no discharge needs identified   Patient/Family Anticipates Transition to home;home with family   Patient/Family Anticipated Services at Transition none   Transportation Concerns car, none   Transportation Anticipated car, drives self;family or friend will provide   Anticipated Changes Related to Illness none   Equipment Needed After Discharge none   Offered/Gave Vendor List no   Disability   Equipment Currently Used at Home none     Goal: Interprofessional Rounds/Family Conf  Outcome: Ongoing (interventions implemented as appropriate)   03/10/19 1534   Interdisciplinary Rounds/Family Conf   Participants nursing;physician       Problem: Postpartum (Vaginal Delivery) (Adult,Obstetrics,Pediatric)  Goal: Signs and Symptoms of Listed Potential Problems Will be Absent, Minimized or Managed (Postpartum)  Outcome: Ongoing (interventions implemented as appropriate)   03/10/19 1534   Goal/Outcome Evaluation   Problems Assessed (Postpartum Vaginal Delivery) all   Problems Present (Postpartum Vag Deliv) none       Problem: Breastfeeding (Adult,Obstetrics,Pediatric)  Goal: Signs and Symptoms of Listed Potential Problems Will be Absent, Minimized or  Managed (Breastfeeding)  Outcome: Ongoing (interventions implemented as appropriate)   03/10/19 1534   Goal/Outcome Evaluation   Problems Assessed (Breastfeeding) all   Problems Present (Breastfeeding) none

## 2019-03-10 NOTE — LACTATION NOTE
Pt declines assistance at this time.  Lots of formula being given.  Room full of visitors.  Encouraged to call LC as needed.

## 2019-03-10 NOTE — LACTATION NOTE
RN requested LC to room for assistance.  Pt has infant in deep latch and declines assistance.  Reviewed feeding patterns and importance of stimulation if infant getting supplements.  Pt encouraged to call for assistance as needed.

## 2019-03-11 VITALS
DIASTOLIC BLOOD PRESSURE: 74 MMHG | BODY MASS INDEX: 24.73 KG/M2 | WEIGHT: 131 LBS | HEART RATE: 84 BPM | RESPIRATION RATE: 16 BRPM | SYSTOLIC BLOOD PRESSURE: 108 MMHG | OXYGEN SATURATION: 99 % | HEIGHT: 61 IN | TEMPERATURE: 98.3 F

## 2019-03-11 RX ORDER — IBUPROFEN 800 MG/1
800 TABLET ORAL EVERY 8 HOURS PRN
Qty: 30 TABLET | Refills: 3 | OUTPATIENT
Start: 2019-03-11 | End: 2020-03-10

## 2019-03-11 RX ORDER — OXYCODONE HYDROCHLORIDE AND ACETAMINOPHEN 5; 325 MG/1; MG/1
2 TABLET ORAL EVERY 4 HOURS PRN
Qty: 20 TABLET | Refills: 0 | Status: SHIPPED | OUTPATIENT
Start: 2019-03-11 | End: 2019-03-13

## 2019-03-11 RX ADMIN — DOCUSATE SODIUM 100 MG: 100 CAPSULE, LIQUID FILLED ORAL at 09:34

## 2019-03-11 RX ADMIN — OXYCODONE HYDROCHLORIDE AND ACETAMINOPHEN 1 TABLET: 10; 325 TABLET ORAL at 00:06

## 2019-03-11 RX ADMIN — OXYCODONE AND ACETAMINOPHEN 1 TABLET: 5; 325 TABLET ORAL at 09:35

## 2019-03-11 RX ADMIN — IBUPROFEN 800 MG: 800 TABLET, FILM COATED ORAL at 09:34

## 2019-03-11 RX ADMIN — Medication 1 TABLET: at 09:34

## 2019-03-11 RX ADMIN — OXYCODONE AND ACETAMINOPHEN 1 TABLET: 5; 325 TABLET ORAL at 15:03

## 2019-03-11 NOTE — DISCHARGE SUMMARY
Date of Discharge:  3/11/2019    Discharge Diagnosis: vaginal delivery    Presenting Problem/History of Present Illness  Pregnancy [Z34.90]  Pregnancy [Z34.90]  Spontaneous vaginal delivery [O80]       Hospital Course  Patient is a 28 y.o. female presented with labor at term.  Delivered viable female infant per Dr. Whitaker.  No pp complications.  Ready for d/c.      Procedures Performed         Consults:   Consults     No orders found from 2019 to 3/9/2019.          Condition on Discharge:   Subjective   Postpartum Day 2 Vaginal Delivery.    The patient feels well without complaints.    Vital Signs  Temp:  [97.7 °F (36.5 °C)-98.3 °F (36.8 °C)] 98.3 °F (36.8 °C)  Heart Rate:  [84-87] 84  Resp:  [16] 16  BP: (104-114)/(69-75) 108/74    Physical Exam:   General: Awake and alert   Abdomen: Fundus: firm, non tender    Extremities:  Calves NT bilaterally    Assessment/Plan     PPD2  S/P  -   Stable for discharge. Instructions reviewed      Discharge Disposition  Home or Self Care    Discharge Medications     Discharge Medications      New Medications      Instructions Start Date   ibuprofen 800 MG tablet  Commonly known as:  ADVIL,MOTRIN   800 mg, Oral, Every 8 Hours PRN      oxyCODONE-acetaminophen 5-325 MG per tablet  Commonly known as:  PERCOCET   2 tablets, Oral, Every 4 Hours PRN         Continue These Medications      Instructions Start Date   prenatal vitamin 27-0.8 27-0.8 MG tablet tablet   Oral, Daily               The patient has been prescribed a controlled substance.  She has been counseled on the risks associated with using the medication.   The addictive potential of this medication and alternatives were discussed carefully with this patient and she demonstrated understanding.  A JONATHAN report has been obtained and reviewed.       Activity at Discharge:     Follow-up Appointments  No future appointments.      Test Results Pending at Discharge       Sarita Ashford, MALACHI  19  9:32  AM

## 2019-03-11 NOTE — LACTATION NOTE
Mom reports baby is latching good but baby gets formula in nursery. Mom reports she plans to BF for a year. Educated on supply and demand and encouraged mom to BF every 2-3 hours. Encouraged mom to review provided booklet on BF. Mom denies questions at this time. Gave [facility] card for f/u  Lactation Consult Note    Evaluation Completed: 3/11/2019 8:28 AM  Patient Name: Kamron Delgado  :  1990  MRN:  [id number]     REFERRAL  INFORMATION:                          Date of Referral: 19   Person Making Referral: patient  Maternal Reason for Referral: breastfeeding currently       DELIVERY HISTORY:          Skin to skin initiation date/time: 3/9/2019  10:14 AM   Skin to skin end date/time:              MATERNAL ASSESSMENT:                               INFANT ASSESSMENT:  Information for the patient's :  Ross Delgado [[id number]]   No past medical history on file.                                                                                                                                MATERNAL INFANT FEEDING:                                                                       EQUIPMENT TYPE:                                 BREAST PUMPING:          LACTATION REFERRALS:

## 2021-05-12 ENCOUNTER — APPOINTMENT (OUTPATIENT)
Dept: VACCINE CLINIC | Facility: HOSPITAL | Age: 31
End: 2021-05-12

## 2021-08-18 ENCOUNTER — IMMUNIZATION (OUTPATIENT)
Dept: VACCINE CLINIC | Facility: HOSPITAL | Age: 31
End: 2021-08-18

## 2021-08-18 PROCEDURE — 91300 HC SARSCOV02 VAC 30MCG/0.3ML IM: CPT | Performed by: INTERNAL MEDICINE

## 2021-08-18 PROCEDURE — 0001A: CPT | Performed by: INTERNAL MEDICINE

## 2021-09-08 ENCOUNTER — IMMUNIZATION (OUTPATIENT)
Dept: VACCINE CLINIC | Facility: HOSPITAL | Age: 31
End: 2021-09-08

## 2021-09-08 PROCEDURE — 91300 HC SARSCOV02 VAC 30MCG/0.3ML IM: CPT | Performed by: INTERNAL MEDICINE

## 2021-09-08 PROCEDURE — 0002A: CPT | Performed by: INTERNAL MEDICINE

## 2022-12-06 LAB
EXTERNAL HEPATITIS B SURFACE ANTIGEN: NEGATIVE
EXTERNAL HEPATITIS C AB: NORMAL
EXTERNAL RUBELLA QUALITATIVE: NORMAL
EXTERNAL SYPHILIS RPR SCREEN: NORMAL
HIV1 P24 AG SERPL QL IA: NORMAL

## 2023-03-02 ENCOUNTER — HOSPITAL ENCOUNTER (EMERGENCY)
Facility: HOSPITAL | Age: 33
Discharge: HOME | End: 2023-03-03
Attending: OBSTETRICS & GYNECOLOGY | Admitting: OBSTETRICS & GYNECOLOGY
Payer: COMMERCIAL

## 2023-03-02 LAB
ALBUMIN SERPL-MCNC: 4.2 G/DL (ref 3.5–5.2)
ALBUMIN/GLOB SERPL: 1.4 G/DL
ALP SERPL-CCNC: 54 U/L (ref 39–117)
ALT SERPL W P-5'-P-CCNC: 11 U/L (ref 1–33)
AMORPH URATE CRY URNS QL MICRO: ABNORMAL /HPF
ANION GAP SERPL CALCULATED.3IONS-SCNC: 9.7 MMOL/L (ref 5–15)
AST SERPL-CCNC: 17 U/L (ref 1–32)
BACTERIA UR QL AUTO: ABNORMAL /HPF
BASOPHILS # BLD AUTO: 0.05 10*3/MM3 (ref 0–0.2)
BASOPHILS NFR BLD AUTO: 0.3 % (ref 0–1.5)
BILIRUB SERPL-MCNC: 0.2 MG/DL (ref 0–1.2)
BILIRUB UR QL STRIP: NEGATIVE
BUN SERPL-MCNC: 4 MG/DL (ref 6–20)
BUN/CREAT SERPL: 8.9 (ref 7–25)
CALCIUM SPEC-SCNC: 8.6 MG/DL (ref 8.6–10.5)
CHLORIDE SERPL-SCNC: 99 MMOL/L (ref 98–107)
CLARITY UR: ABNORMAL
CO2 SERPL-SCNC: 23.3 MMOL/L (ref 22–29)
COLOR UR: YELLOW
CREAT SERPL-MCNC: 0.45 MG/DL (ref 0.57–1)
DEPRECATED RDW RBC AUTO: 40.1 FL (ref 37–54)
EGFRCR SERPLBLD CKD-EPI 2021: 132.9 ML/MIN/1.73
EOSINOPHIL # BLD AUTO: 0.03 10*3/MM3 (ref 0–0.4)
EOSINOPHIL NFR BLD AUTO: 0.2 % (ref 0.3–6.2)
ERYTHROCYTE [DISTWIDTH] IN BLOOD BY AUTOMATED COUNT: 11.5 % (ref 12.3–15.4)
GLOBULIN UR ELPH-MCNC: 3 GM/DL
GLUCOSE SERPL-MCNC: 101 MG/DL (ref 65–99)
GLUCOSE UR STRIP-MCNC: NEGATIVE MG/DL
HCT VFR BLD AUTO: 37.6 % (ref 34–46.6)
HGB BLD-MCNC: 13 G/DL (ref 12–15.9)
HGB UR QL STRIP.AUTO: NEGATIVE
HYALINE CASTS UR QL AUTO: ABNORMAL /LPF
IMM GRANULOCYTES # BLD AUTO: 0.09 10*3/MM3 (ref 0–0.05)
IMM GRANULOCYTES NFR BLD AUTO: 0.5 % (ref 0–0.5)
KETONES UR QL STRIP: NEGATIVE
LEUKOCYTE ESTERASE UR QL STRIP.AUTO: ABNORMAL
LIPASE SERPL-CCNC: 28 U/L (ref 13–60)
LYMPHOCYTES # BLD AUTO: 1.47 10*3/MM3 (ref 0.7–3.1)
LYMPHOCYTES NFR BLD AUTO: 8.3 % (ref 19.6–45.3)
MCH RBC QN AUTO: 32.9 PG (ref 26.6–33)
MCHC RBC AUTO-ENTMCNC: 34.6 G/DL (ref 31.5–35.7)
MCV RBC AUTO: 95.2 FL (ref 79–97)
MONOCYTES # BLD AUTO: 0.68 10*3/MM3 (ref 0.1–0.9)
MONOCYTES NFR BLD AUTO: 3.9 % (ref 5–12)
NEUTROPHILS NFR BLD AUTO: 15.32 10*3/MM3 (ref 1.7–7)
NEUTROPHILS NFR BLD AUTO: 86.8 % (ref 42.7–76)
NITRITE UR QL STRIP: NEGATIVE
NRBC BLD AUTO-RTO: 0 /100 WBC (ref 0–0.2)
PH UR STRIP.AUTO: 7 [PH] (ref 5–8)
PLATELET # BLD AUTO: 198 10*3/MM3 (ref 140–450)
PMV BLD AUTO: 9.6 FL (ref 6–12)
POTASSIUM SERPL-SCNC: 3.1 MMOL/L (ref 3.5–5.2)
PROT SERPL-MCNC: 7.2 G/DL (ref 6–8.5)
PROT UR QL STRIP: NEGATIVE
RBC # BLD AUTO: 3.95 10*6/MM3 (ref 3.77–5.28)
RBC # UR STRIP: ABNORMAL /HPF
REF LAB TEST METHOD: ABNORMAL
SODIUM SERPL-SCNC: 132 MMOL/L (ref 136–145)
SP GR UR STRIP: 1.02 (ref 1–1.03)
SQUAMOUS #/AREA URNS HPF: ABNORMAL /HPF
UROBILINOGEN UR QL STRIP: ABNORMAL
WBC # UR STRIP: ABNORMAL /HPF
WBC NRBC COR # BLD: 17.64 10*3/MM3 (ref 3.4–10.8)

## 2023-03-02 PROCEDURE — 85025 COMPLETE CBC W/AUTO DIFF WBC: CPT | Performed by: OBSTETRICS & GYNECOLOGY

## 2023-03-02 PROCEDURE — 96375 TX/PRO/DX INJ NEW DRUG ADDON: CPT

## 2023-03-02 PROCEDURE — 80053 COMPREHEN METABOLIC PANEL: CPT | Performed by: OBSTETRICS & GYNECOLOGY

## 2023-03-02 PROCEDURE — 87086 URINE CULTURE/COLONY COUNT: CPT | Performed by: OBSTETRICS & GYNECOLOGY

## 2023-03-02 PROCEDURE — 81001 URINALYSIS AUTO W/SCOPE: CPT | Performed by: OBSTETRICS & GYNECOLOGY

## 2023-03-02 PROCEDURE — G0463 HOSPITAL OUTPT CLINIC VISIT: HCPCS

## 2023-03-02 PROCEDURE — 96375 TX/PRO/DX INJ NEW DRUG ADDON: CPT | Performed by: OBSTETRICS & GYNECOLOGY

## 2023-03-02 PROCEDURE — 25010000002 ONDANSETRON PER 1 MG: Performed by: OBSTETRICS & GYNECOLOGY

## 2023-03-02 PROCEDURE — 96365 THER/PROPH/DIAG IV INF INIT: CPT

## 2023-03-02 PROCEDURE — 25010000002 HYDROMORPHONE PER 4 MG: Performed by: OBSTETRICS & GYNECOLOGY

## 2023-03-02 PROCEDURE — 83690 ASSAY OF LIPASE: CPT | Performed by: OBSTETRICS & GYNECOLOGY

## 2023-03-02 PROCEDURE — 99284 EMERGENCY DEPT VISIT MOD MDM: CPT | Performed by: OBSTETRICS & GYNECOLOGY

## 2023-03-02 PROCEDURE — 96361 HYDRATE IV INFUSION ADD-ON: CPT | Performed by: OBSTETRICS & GYNECOLOGY

## 2023-03-02 PROCEDURE — 0 POTASSIUM CHLORIDE 10 MEQ/100ML SOLUTION: Performed by: OBSTETRICS & GYNECOLOGY

## 2023-03-02 RX ORDER — HYDROMORPHONE HYDROCHLORIDE 1 MG/ML
0.5 INJECTION, SOLUTION INTRAMUSCULAR; INTRAVENOUS; SUBCUTANEOUS ONCE
Status: COMPLETED | OUTPATIENT
Start: 2023-03-02 | End: 2023-03-02

## 2023-03-02 RX ORDER — PROCHLORPERAZINE MALEATE 5 MG/1
5 TABLET ORAL ONCE
Status: COMPLETED | OUTPATIENT
Start: 2023-03-02 | End: 2023-03-02

## 2023-03-02 RX ORDER — SIMETHICONE 80 MG
80 TABLET,CHEWABLE ORAL 4 TIMES DAILY PRN
Status: DISCONTINUED | OUTPATIENT
Start: 2023-03-02 | End: 2023-03-03 | Stop reason: HOSPADM

## 2023-03-02 RX ORDER — ONDANSETRON 2 MG/ML
4 INJECTION INTRAMUSCULAR; INTRAVENOUS EVERY 6 HOURS PRN
Status: DISCONTINUED | OUTPATIENT
Start: 2023-03-02 | End: 2023-03-03 | Stop reason: HOSPADM

## 2023-03-02 RX ORDER — SODIUM CHLORIDE, SODIUM LACTATE, POTASSIUM CHLORIDE, CALCIUM CHLORIDE 600; 310; 30; 20 MG/100ML; MG/100ML; MG/100ML; MG/100ML
125 INJECTION, SOLUTION INTRAVENOUS CONTINUOUS
Status: DISCONTINUED | OUTPATIENT
Start: 2023-03-02 | End: 2023-03-03 | Stop reason: HOSPADM

## 2023-03-02 RX ORDER — PROCHLORPERAZINE EDISYLATE 5 MG/ML
5 INJECTION INTRAMUSCULAR; INTRAVENOUS ONCE
Status: COMPLETED | OUTPATIENT
Start: 2023-03-02 | End: 2023-03-02

## 2023-03-02 RX ORDER — SODIUM CHLORIDE 0.9 % (FLUSH) 0.9 %
10 SYRINGE (ML) INJECTION AS NEEDED
Status: DISCONTINUED | OUTPATIENT
Start: 2023-03-02 | End: 2023-03-03 | Stop reason: HOSPADM

## 2023-03-02 RX ORDER — FAMOTIDINE 10 MG/ML
20 INJECTION, SOLUTION INTRAVENOUS ONCE
Status: COMPLETED | OUTPATIENT
Start: 2023-03-02 | End: 2023-03-02

## 2023-03-02 RX ORDER — POTASSIUM CHLORIDE 7.45 MG/ML
10 INJECTION INTRAVENOUS
Status: DISCONTINUED | OUTPATIENT
Start: 2023-03-02 | End: 2023-03-03 | Stop reason: HOSPADM

## 2023-03-02 RX ORDER — PROCHLORPERAZINE 25 MG
25 SUPPOSITORY, RECTAL RECTAL ONCE
Status: COMPLETED | OUTPATIENT
Start: 2023-03-02 | End: 2023-03-02

## 2023-03-02 RX ORDER — SODIUM CHLORIDE 0.9 % (FLUSH) 0.9 %
10 SYRINGE (ML) INJECTION EVERY 12 HOURS SCHEDULED
Status: DISCONTINUED | OUTPATIENT
Start: 2023-03-02 | End: 2023-03-03 | Stop reason: HOSPADM

## 2023-03-02 RX ORDER — PRENATAL VIT NO.126/IRON/FOLIC 28MG-0.8MG
TABLET ORAL DAILY
COMMUNITY

## 2023-03-02 RX ORDER — SODIUM CHLORIDE, SODIUM LACTATE, POTASSIUM CHLORIDE, CALCIUM CHLORIDE 600; 310; 30; 20 MG/100ML; MG/100ML; MG/100ML; MG/100ML
999 INJECTION, SOLUTION INTRAVENOUS CONTINUOUS
Status: DISCONTINUED | OUTPATIENT
Start: 2023-03-02 | End: 2023-03-03

## 2023-03-02 RX ORDER — ONDANSETRON 2 MG/ML
4 INJECTION INTRAMUSCULAR; INTRAVENOUS EVERY 6 HOURS PRN
Status: DISCONTINUED | OUTPATIENT
Start: 2023-03-02 | End: 2023-03-02

## 2023-03-02 RX ADMIN — SODIUM CHLORIDE, POTASSIUM CHLORIDE, SODIUM LACTATE AND CALCIUM CHLORIDE 999 ML/HR: 600; 310; 30; 20 INJECTION, SOLUTION INTRAVENOUS at 20:36

## 2023-03-02 RX ADMIN — FAMOTIDINE 20 MG: 10 INJECTION INTRAVENOUS at 23:35

## 2023-03-02 RX ADMIN — HYDROMORPHONE HYDROCHLORIDE 0.5 MG: 1 INJECTION, SOLUTION INTRAMUSCULAR; INTRAVENOUS; SUBCUTANEOUS at 23:43

## 2023-03-02 RX ADMIN — PROCHLORPERAZINE 25 MG: 25 SUPPOSITORY RECTAL at 23:38

## 2023-03-02 RX ADMIN — SODIUM CHLORIDE, POTASSIUM CHLORIDE, SODIUM LACTATE AND CALCIUM CHLORIDE 125 ML/HR: 600; 310; 30; 20 INJECTION, SOLUTION INTRAVENOUS at 23:30

## 2023-03-02 RX ADMIN — POTASSIUM CHLORIDE 10 MEQ: 7.46 INJECTION, SOLUTION INTRAVENOUS at 23:47

## 2023-03-02 RX ADMIN — ONDANSETRON 4 MG: 2 INJECTION INTRAMUSCULAR; INTRAVENOUS at 20:36

## 2023-03-02 RX ADMIN — Medication 10 ML: at 23:25

## 2023-03-02 NOTE — OBED NOTES
[facility] Provider Note        3/2/2023 18:46 EST    Kamron Delgado is a 32 y.o.  at 19w5d TERESITA  2023, by Patient Reported who presents for : Abdominal Pain (Pt 21 wks with complaints of abd pain that started around 1600 after she got done bowling. Pt states the pain comes and goes and feels like cramping. Pt has not felt fetal movement yet this pregnancy. Pt denies LOF and vaginal bleeding. ALMAZ)          HPI: Patient is a 32 year old  @ 19 5/7 weeks who complains of cramping abdominal pain that started approximately 1600 after she went bowling.  The pain comes and goes like contractions.  It is diffuse over her entire abdomen and is not localized.     She denies leaking fluid or bleeding.  She has not yet felt fetal movement this pregnancy.    The patient attempted to drink water in triage and had two episodes of vomiting.  She now rates her abdominal pain 8/10 and it is constant, non-localized.  She denies constipation or diarrhea.  She states that she has been eating what she usually eats and had not eaten out recently.  No sick contacts at home.       denies ROM  admits to cramping   denies bleeding    Prenatal care with Donna Neff MD    Patient Active Problem List    Diagnosis    • Postpartum anemia [O90.81]    • Pregnancy [Z34.90]          POB:   OB History    Para Term  AB Living   2 1 1 0 0 1   SAB IAB Ectopic Molar Multiple Live Births   0 0 0 0 0 1      # Outcome Date GA Lbr Abdoulaye/2nd Weight Sex Delivery Anes PTL Lv   2 Current            1 Term 19 38w6d 11:28 / 02:45 3078 g (6 lb 12.6 oz) F Vag-Spont EPI N OBDULIO      Birth Comments: scale 3      Name: ATA DELGADO      Apgar1: 8  Apgar5: 9      PMH: History reviewed. No pertinent past medical history.  PSH:   Past Surgical History:   Procedure Laterality Date   • WISDOM TOOTH EXTRACTION       FH:    Family History   Problem Relation Age of Onset   • Lung cancer Father    • Breast cancer Mother   "    SH:   Social History     Tobacco Use   • Smoking status: Never   Vaping Use   • Vaping Use: Never used   Substance Use Topics   • Alcohol use: No   • Drug use: No       Allergies: Patient has no known allergies.    Medications:   Medications Prior to Admission   Medication Sig Dispense Refill Last Dose   • prenatal vitamin (prenatal, CLASSIC, vitamin) tablet Take  by mouth Daily.   3/1/2023       Review of Systems  A 14 system review was completed and negative except for what is noted in the HPI or below  Pertinent items are noted in HPI      Physical exam    Temp:  [98.1 °F (36.7 °C)] 98.1 °F (36.7 °C)  Heart Rate:  [65-84] 76  Resp:  [16] 16  BP: (111)/(67) 111/67  Estimated body mass index is 22.67 kg/m² as calculated from the following:    Height as of this encounter: 154.9 cm (61\").    Weight as of 6/2/20: 54.4 kg (120 lb).    General appearance - alert, well appearing, and in no distress and in mild to moderate distress  Chest - unlabored breathing effort  Heart - normal rate and regular rhythm  Abdomen - soft, nontender, gravid, no rebound tenderness  Pelvic - normal external genitalia, vulva, vagina, cervix, uterus and adnexa, cervix long, closed, posterior, no blood on glove           Membranes: Intact           Vaginal Exam  Method: sterile exam per physician  Vaginal Bleeding: not present  Cervical Position: posterior  Cervical Consistency: firm  Cervical Dilation (cm): 0  Cervical Effacement: 0%  Fetal Station: -3          FHR: 140s  CTX: none on toco     U/S reviewed: not performed.       External Prenatal Results     Pregnancy Outside Results - Transcribed From Office Records - See Scanned Records For Details     Test Value Date Time    ABO  O  03/08/19 2105    Rh  Positive  03/08/19 2105    Antibody Screen  Negative  03/08/19 2105    Varicella IgG       Rubella ^ Immune  08/02/18     Hgb  9.5 g/dL 03/10/19 0619    Hct  29.9 % 03/10/19 0619    Glucose Fasting GTT       Glucose Tolerance Test 1 hour " ^ 134  12/18/18     Glucose Tolerance Test 3 hour       Gonorrhea (discrete)       Chlamydia (discrete)       RPR ^ Non-Reactive  08/02/18     VDRL       Syphilis Antibody       HBsAg ^ Negative  08/02/18     Herpes Simplex Virus PCR       Herpes Simplex VIrus Culture       HIV ^ Non-Reactive  08/02/18     Hep C RNA Quant PCR       Hep C Antibody ^ non-reactive  08/02/18     AFP       Group B Strep ^ Negative  02/19/19     GBS Susceptibility to Clindamycin       GBS Susceptibility to Erythromycin       Fetal Fibronectin       Genetic Testing, Maternal Blood             Drug Screening     Test Value Date Time    Urine Drug Screen       Amphetamine Screen       Barbiturate Screen       Benzodiazepine Screen       Methadone Screen       Phencyclidine Screen       Opiates Screen       THC Screen       Cocaine Screen       Propoxyphene Screen       Buprenorphine Screen       Methamphetamine Screen       Oxycodone Screen       Tricyclic Antidepressants Screen             Legend    ^: Historical                      Component  Ref Range & Units 20:39   Glucose  65 - 99 mg/dL 101 High     BUN  6 - 20 mg/dL 4 Low     Creatinine  0.57 - 1.00 mg/dL 0.45 Low     Sodium  136 - 145 mmol/L 132 Low     Potassium  3.5 - 5.2 mmol/L 3.1 Low     Chloride  98 - 107 mmol/L 99    CO2  22.0 - 29.0 mmol/L 23.3    Calcium  8.6 - 10.5 mg/dL 8.6    Total Protein  6.0 - 8.5 g/dL 7.2    Albumin  3.5 - 5.2 g/dL 4.2    ALT (SGPT)  1 - 33 U/L 11    AST (SGOT)  1 - 32 U/L 17    Alkaline Phosphatase  39 - 117 U/L 54    Total Bilirubin  0.0 - 1.2 mg/dL 0.2      Component  Ref Range & Units 20:39  (3/2/23)     WBC  3.40 - 10.80 10*3/mm3 17.64 High       RBC  3.77 - 5.28 10*6/mm3 3.95      Hemoglobin  12.0 - 15.9 g/dL 13.0      Hematocrit  34.0 - 46.6 % 37.6      MCV  79.0 - 97.0 fL 95.2      MCH  26.6 - 33.0 pg 32.9      MCHC  31.5 - 35.7 g/dL 34.6      RDW  12.3 - 15.4 % 11.5 Low       RDW-SD  37.0 - 54.0 fl 40.1      MPV  6.0 - 12.0 fL 9.6       Platelets  140 - 450 10*3/mm3 198      Neutrophil %  42.7 - 76.0 % 86.8 High       Lymphocyte %  19.6 - 45.3 % 8.3 Low       Monocyte %  5.0 - 12.0 % 3.9 Low       Eosinophil %  0.3 - 6.2 % 0.2 Low       Basophil %  0.0 - 1.5 % 0.3      Immature Grans %  0.0 - 0.5 % 0.5      Neutrophils, Absolute  1.70 - 7.00 10*3/mm3 15.32 High            Impression:   @ 19w5d .  Final Diagnosis: cramping, nausea/vomiting, diffuse abdominal pain    Plan:  1. CBC, CMP, Lipase, UA sent.  2. IV bolus of 1 liter LR, Zofran IV  3.  Risks, benefits of treatment plan have been discussed.  4.  All questions have been answered.        I discussed the patients findings and my recommendations with patient     Addendum:  No improvement in abdominal pain with 1 liter of IV fluid.  Patient will be admitted for observation and K+ replacement.    Plan discussed with patient, family and on-call physician.      Jacklyn Ruth MD  3/2/2023  18:46 EST

## 2023-03-03 VITALS
TEMPERATURE: 98.6 F | HEART RATE: 67 BPM | HEIGHT: 61 IN | SYSTOLIC BLOOD PRESSURE: 90 MMHG | OXYGEN SATURATION: 97 % | BODY MASS INDEX: 22.67 KG/M2 | DIASTOLIC BLOOD PRESSURE: 55 MMHG | RESPIRATION RATE: 16 BRPM

## 2023-03-03 LAB
BASOPHILS # BLD AUTO: 0.05 10*3/MM3 (ref 0–0.2)
BASOPHILS NFR BLD AUTO: 0.3 % (ref 0–1.5)
DEPRECATED RDW RBC AUTO: 38.3 FL (ref 37–54)
EOSINOPHIL # BLD AUTO: 0.03 10*3/MM3 (ref 0–0.4)
EOSINOPHIL NFR BLD AUTO: 0.2 % (ref 0.3–6.2)
ERYTHROCYTE [DISTWIDTH] IN BLOOD BY AUTOMATED COUNT: 11.3 % (ref 12.3–15.4)
HCT VFR BLD AUTO: 32.5 % (ref 34–46.6)
HGB BLD-MCNC: 11.4 G/DL (ref 12–15.9)
IMM GRANULOCYTES # BLD AUTO: 0.09 10*3/MM3 (ref 0–0.05)
IMM GRANULOCYTES NFR BLD AUTO: 0.6 % (ref 0–0.5)
LYMPHOCYTES # BLD AUTO: 1.4 10*3/MM3 (ref 0.7–3.1)
LYMPHOCYTES NFR BLD AUTO: 9 % (ref 19.6–45.3)
MCH RBC QN AUTO: 32.9 PG (ref 26.6–33)
MCHC RBC AUTO-ENTMCNC: 35.1 G/DL (ref 31.5–35.7)
MCV RBC AUTO: 93.9 FL (ref 79–97)
MONOCYTES # BLD AUTO: 0.77 10*3/MM3 (ref 0.1–0.9)
MONOCYTES NFR BLD AUTO: 5 % (ref 5–12)
NEUTROPHILS NFR BLD AUTO: 13.16 10*3/MM3 (ref 1.7–7)
NEUTROPHILS NFR BLD AUTO: 84.9 % (ref 42.7–76)
NRBC BLD AUTO-RTO: 0 /100 WBC (ref 0–0.2)
PLATELET # BLD AUTO: 175 10*3/MM3 (ref 140–450)
PMV BLD AUTO: 9.5 FL (ref 6–12)
POTASSIUM SERPL-SCNC: 3.5 MMOL/L (ref 3.5–5.2)
RBC # BLD AUTO: 3.46 10*6/MM3 (ref 3.77–5.28)
WBC NRBC COR # BLD: 15.5 10*3/MM3 (ref 3.4–10.8)

## 2023-03-03 PROCEDURE — 85025 COMPLETE CBC W/AUTO DIFF WBC: CPT | Performed by: OBSTETRICS & GYNECOLOGY

## 2023-03-03 PROCEDURE — 96366 THER/PROPH/DIAG IV INF ADDON: CPT

## 2023-03-03 PROCEDURE — 84132 ASSAY OF SERUM POTASSIUM: CPT | Performed by: OBSTETRICS & GYNECOLOGY

## 2023-03-03 PROCEDURE — 0 POTASSIUM CHLORIDE 10 MEQ/100ML SOLUTION: Performed by: OBSTETRICS & GYNECOLOGY

## 2023-03-03 PROCEDURE — 96361 HYDRATE IV INFUSION ADD-ON: CPT

## 2023-03-03 RX ORDER — OXYCODONE HYDROCHLORIDE AND ACETAMINOPHEN 5; 325 MG/1; MG/1
1 TABLET ORAL ONCE
Status: COMPLETED | OUTPATIENT
Start: 2023-03-03 | End: 2023-03-03

## 2023-03-03 RX ADMIN — POTASSIUM CHLORIDE 10 MEQ: 7.46 INJECTION, SOLUTION INTRAVENOUS at 06:31

## 2023-03-03 RX ADMIN — OXYCODONE AND ACETAMINOPHEN 1 TABLET: 5; 325 TABLET ORAL at 05:49

## 2023-03-03 RX ADMIN — POTASSIUM CHLORIDE 10 MEQ: 7.46 INJECTION, SOLUTION INTRAVENOUS at 05:15

## 2023-03-03 RX ADMIN — POTASSIUM CHLORIDE 10 MEQ: 7.46 INJECTION, SOLUTION INTRAVENOUS at 03:17

## 2023-03-03 RX ADMIN — POTASSIUM CHLORIDE 10 MEQ: 7.46 INJECTION, SOLUTION INTRAVENOUS at 04:19

## 2023-03-03 RX ADMIN — SODIUM CHLORIDE, POTASSIUM CHLORIDE, SODIUM LACTATE AND CALCIUM CHLORIDE 125 ML/HR: 600; 310; 30; 20 INJECTION, SOLUTION INTRAVENOUS at 12:59

## 2023-03-03 RX ADMIN — POTASSIUM CHLORIDE 10 MEQ: 7.46 INJECTION, SOLUTION INTRAVENOUS at 01:02

## 2023-03-03 NOTE — PROGRESS NOTES
[facility]  Obstetric History and Physical    Patient Name: Kamron Delgado  :  1990  MRN:  [id number]        Chief Complaint   Patient presents with   • Abdominal Pain     Pt 21 wks with complaints of abd pain that started around 1600 after she got done bowling. Pt states the pain comes and goes and feels like cramping. Pt has not felt fetal movement yet this pregnancy. Pt denies LOF and vaginal bleeding. [facility]       Subjective     Patient is a 32 y.o. female  currently at 19w6d, who presents  With c/o intense, diffuse abd pain that started after bowling-- went bowling after working on feet all day.  No vb or lof.  Denies n/v/d but did have 2 episodes vomiting on arrival to hospital.  Has not had any since then.  Feels fine this morning but took percocet around 6am.  Reports no pain now, nontender on exam but when she has pain now says is suprapubic area.           Her prenatal care is significant for  .        Past Medical History: History reviewed. No pertinent past medical history.   Past Surgical History Past Surgical History:   Procedure Laterality Date   • WISDOM TOOTH EXTRACTION        Family History: Family History   Problem Relation Age of Onset   • Lung cancer Father    • Breast cancer Mother       Social History:  reports that she has never smoked. She does not have any smokeless tobacco history on file.   reports no history of alcohol use.   reports no history of drug use.    Allergies:     Patient has no known allergies.     Past OB History:       OB History    Para Term  AB Living   2 1 1 0 0 1   SAB IAB Ectopic Molar Multiple Live Births   0 0 0 0 0 1      # Outcome Date GA Lbr Abdoulaye/2nd Weight Sex Delivery Anes PTL Lv   2 Current            1 Term 19 38w6d 11:28 / 02:45 3078 g (6 lb 12.6 oz) F Vag-Spont EPI N OBDULIO      Birth Comments: scale 3      Name: ATA DELGADO      Apgar1: 8  Apgar5: 9         Objective       Vital Signs Range for the last 24  hours  Temperature: Temp:  [98 °F (36.7 °C)-98.6 °F (37 °C)] 98.6 °F (37 °C)   Temp Source: Temp src: Oral   BP: BP: ()/(51-67) 90/55   Pulse: Heart Rate:  [65-84] 67   Respirations: Resp:  [16] 16             Physical Exam:        General :    Alert and cooperative in NAD   Lungs:   Clear to auscultation bilaterally   CV:   Regular rate and rhythm   Abdomen:  Gravid, non-tender, no masses   Vaginal exam: Deferred-- closed on admission     LE:   none                 A/P:  19w6d w/ diffuse abd pain- resolved now after pain medication.  No n/v/d.     Elevated wbc on admission, improved this am.     Low potassium on admission-- has been getting supplementation.  Has redraw scheduled at 1130 this am.   Wants to eat breakfast, I think that is fine as not acute abd.  No evidence appendicitis.  Will allow light meal, stop IVF and repeat potassium.     D/w poss musculoskeletal pain.  No evidence PTL/ cervical incompetence. UC in progress.  Will f/u later today        Problem List Items Addressed This Visit    None              Sarita Ashford, MALACHI  3/3/2023  09:53 EST

## 2023-03-03 NOTE — DISCHARGE SUMMARY
Date of Discharge:  3/3/2023    Discharge Diagnosis: abd pain- unspecified origin    Presenting Problem/History of Present Illness  No admission diagnoses are documented for this encounter.       Hospital Course  Patient is a 32 y.o. female presented with abd pain-- see earlier note today.  No further pain-- feels a little sore.  Was sleeping on arrival.  Says is just tired.  Potassium replacement and levels normal now.  Will plan d/c today.      Procedures Performed         Consults:   Consults     No orders found for last 30 day(s).          Pertinent Test Results: labs: cbc/ K+    Condition on Discharge:  Feels well.  No vb.  Ready for discharge    Vital Signs  Temp:  [98 °F (36.7 °C)-98.6 °F (37 °C)] 98.6 °F (37 °C)  Heart Rate:  [65-84] 67  Resp:  [16] 16  BP: ()/(51-67) 90/55    Physical Exam:  General: Awake and alert  Abdomen: Gravid, soft,  non tender  Extremities:  Calves NT bilaterally        Discharge Disposition  Home or Self Care    Discharge Medications     Discharge Medications      Continue These Medications      Instructions Start Date   prenatal (CLASSIC) vitamin  tablet  Generic drug: prenatal vitamin   Oral, Daily             Discharge Diet:     Activity at Discharge:     Follow-up Appointments  No future appointments.      Test Results Pending at Discharge  Pending Labs     Order Current Status    Urine Culture - Urine, Urine, Clean Catch Preliminary result           Sarita Ashford, MALACHI  03/03/23  15:24 EST

## 2023-03-03 NOTE — PLAN OF CARE
Goal Outcome Evaluation:  Plan of Care Reviewed With: patient, spouse        Progress: no change  Outcome Evaluation: VSS, afebrile, potassium replacement therapy, antiemetics given, LR at 125cc/hr, received 1 Liter bolus of LR. one time doses of dilaudid and percocet were given to alleviate pain level that became not tolerable to pt. pt. fell asleep between potassium doses but overall very broken sleep last night. cbc to be drawn this a.m.

## 2023-03-03 NOTE — NURSING NOTE
D/C orders received. D/C teaching provided to pt. Pt and spouse verbalized understanding of all teaching and agree with POC. Time was allotted for questions and concerns. FHTs were obtained, IV was removed and pt was encouraged to come back into the hospital should she have any concerns or if symptoms reoccur or become more severe. Emphaszed the importance of keep all upcoming appointments. Pt verbalized understanding and agreed with all teaching.

## 2023-03-04 LAB — BACTERIA SPEC AEROBE CULT: NO GROWTH

## 2023-06-15 ENCOUNTER — TRANSCRIBE ORDERS (OUTPATIENT)
Dept: ADMINISTRATIVE | Facility: HOSPITAL | Age: 33
End: 2023-06-15
Payer: COMMERCIAL

## 2023-06-15 DIAGNOSIS — M79.622 ARMPIT PAIN, LEFT: Primary | ICD-10-CM

## 2023-06-27 LAB — EXTERNAL GROUP B STREP ANTIGEN: NEGATIVE

## 2023-07-17 ENCOUNTER — HOSPITAL ENCOUNTER (INPATIENT)
Facility: HOSPITAL | Age: 33
LOS: 2 days | Discharge: HOME | End: 2023-07-19
Attending: OBSTETRICS & GYNECOLOGY | Admitting: OBSTETRICS & GYNECOLOGY
Payer: COMMERCIAL

## 2023-07-17 LAB
ABO GROUP BLD: NORMAL
ALBUMIN SERPL-MCNC: 3.6 G/DL (ref 3.5–5.2)
ALBUMIN/GLOB SERPL: 1.2 G/DL
ALP SERPL-CCNC: 138 U/L (ref 39–117)
ALT SERPL W P-5'-P-CCNC: 10 U/L (ref 1–33)
ANION GAP SERPL CALCULATED.3IONS-SCNC: 11.3 MMOL/L (ref 5–15)
AST SERPL-CCNC: 17 U/L (ref 1–32)
BASOPHILS # BLD AUTO: 0.03 10*3/MM3 (ref 0–0.2)
BASOPHILS NFR BLD AUTO: 0.3 % (ref 0–1.5)
BILIRUB SERPL-MCNC: <0.2 MG/DL (ref 0–1.2)
BLD GP AB SCN SERPL QL: NEGATIVE
BUN SERPL-MCNC: 5 MG/DL (ref 6–20)
BUN/CREAT SERPL: 10 (ref 7–25)
CALCIUM SPEC-SCNC: 9.1 MG/DL (ref 8.6–10.5)
CHLORIDE SERPL-SCNC: 105 MMOL/L (ref 98–107)
CO2 SERPL-SCNC: 22.7 MMOL/L (ref 22–29)
CREAT SERPL-MCNC: 0.5 MG/DL (ref 0.57–1)
DEPRECATED RDW RBC AUTO: 43.3 FL (ref 37–54)
EGFRCR SERPLBLD CKD-EPI 2021: 129.6 ML/MIN/1.73
EOSINOPHIL # BLD AUTO: 0.09 10*3/MM3 (ref 0–0.4)
EOSINOPHIL NFR BLD AUTO: 1 % (ref 0.3–6.2)
ERYTHROCYTE [DISTWIDTH] IN BLOOD BY AUTOMATED COUNT: 13 % (ref 12.3–15.4)
GLOBULIN UR ELPH-MCNC: 3.1 GM/DL
GLUCOSE SERPL-MCNC: 122 MG/DL (ref 65–99)
HCT VFR BLD AUTO: 36.5 % (ref 34–46.6)
HGB BLD-MCNC: 11.8 G/DL (ref 12–15.9)
IMM GRANULOCYTES # BLD AUTO: 0.03 10*3/MM3 (ref 0–0.05)
IMM GRANULOCYTES NFR BLD AUTO: 0.3 % (ref 0–0.5)
LYMPHOCYTES # BLD AUTO: 2.08 10*3/MM3 (ref 0.7–3.1)
LYMPHOCYTES NFR BLD AUTO: 23.3 % (ref 19.6–45.3)
MCH RBC QN AUTO: 29.6 PG (ref 26.6–33)
MCHC RBC AUTO-ENTMCNC: 32.3 G/DL (ref 31.5–35.7)
MCV RBC AUTO: 91.5 FL (ref 79–97)
MONOCYTES # BLD AUTO: 0.61 10*3/MM3 (ref 0.1–0.9)
MONOCYTES NFR BLD AUTO: 6.8 % (ref 5–12)
NEUTROPHILS NFR BLD AUTO: 6.08 10*3/MM3 (ref 1.7–7)
NEUTROPHILS NFR BLD AUTO: 68.3 % (ref 42.7–76)
NRBC BLD AUTO-RTO: 0 /100 WBC (ref 0–0.2)
PLATELET # BLD AUTO: 171 10*3/MM3 (ref 140–450)
PMV BLD AUTO: 10.4 FL (ref 6–12)
POTASSIUM SERPL-SCNC: 3.8 MMOL/L (ref 3.5–5.2)
PROT SERPL-MCNC: 6.7 G/DL (ref 6–8.5)
RBC # BLD AUTO: 3.99 10*6/MM3 (ref 3.77–5.28)
RH BLD: POSITIVE
SODIUM SERPL-SCNC: 139 MMOL/L (ref 136–145)
T&S EXPIRATION DATE: NORMAL
WBC NRBC COR # BLD: 8.92 10*3/MM3 (ref 3.4–10.8)

## 2023-07-17 PROCEDURE — 86900 BLOOD TYPING SEROLOGIC ABO: CPT | Performed by: OBSTETRICS & GYNECOLOGY

## 2023-07-17 PROCEDURE — 85025 COMPLETE CBC W/AUTO DIFF WBC: CPT | Performed by: OBSTETRICS & GYNECOLOGY

## 2023-07-17 PROCEDURE — 80053 COMPREHEN METABOLIC PANEL: CPT | Performed by: OBSTETRICS & GYNECOLOGY

## 2023-07-17 PROCEDURE — 86850 RBC ANTIBODY SCREEN: CPT | Performed by: OBSTETRICS & GYNECOLOGY

## 2023-07-17 PROCEDURE — 0KQM0ZZ REPAIR PERINEUM MUSCLE, OPEN APPROACH: ICD-10-PCS | Performed by: OBSTETRICS & GYNECOLOGY

## 2023-07-17 PROCEDURE — 86901 BLOOD TYPING SEROLOGIC RH(D): CPT | Performed by: OBSTETRICS & GYNECOLOGY

## 2023-07-17 RX ORDER — PHYTONADIONE 1 MG/.5ML
INJECTION, EMULSION INTRAMUSCULAR; INTRAVENOUS; SUBCUTANEOUS
Status: ACTIVE
Start: 2023-07-17 | End: 2023-07-18

## 2023-07-17 RX ORDER — ONDANSETRON 2 MG/ML
4 INJECTION INTRAMUSCULAR; INTRAVENOUS EVERY 6 HOURS PRN
Status: DISCONTINUED | OUTPATIENT
Start: 2023-07-17 | End: 2023-07-18 | Stop reason: HOSPADM

## 2023-07-17 RX ORDER — FAMOTIDINE 10 MG/ML
20 INJECTION, SOLUTION INTRAVENOUS 2 TIMES DAILY PRN
Status: DISCONTINUED | OUTPATIENT
Start: 2023-07-17 | End: 2023-07-18 | Stop reason: HOSPADM

## 2023-07-17 RX ORDER — OXYTOCIN/0.9 % SODIUM CHLORIDE 30/500 ML
2-20 PLASTIC BAG, INJECTION (ML) INTRAVENOUS
Status: DISCONTINUED | OUTPATIENT
Start: 2023-07-17 | End: 2023-07-18 | Stop reason: HOSPADM

## 2023-07-17 RX ORDER — FENTANYL CIT 0.2 MG/100ML-ROPIV 0.2%-NACL 0.9% EPIDURAL INJ 2/0.2 MCG/ML-%
10 SOLUTION INJECTION CONTINUOUS
Status: DISCONTINUED | OUTPATIENT
Start: 2023-07-17 | End: 2023-07-18

## 2023-07-17 RX ORDER — SODIUM CHLORIDE, SODIUM LACTATE, POTASSIUM CHLORIDE, CALCIUM CHLORIDE 600; 310; 30; 20 MG/100ML; MG/100ML; MG/100ML; MG/100ML
125 INJECTION, SOLUTION INTRAVENOUS CONTINUOUS
Status: DISCONTINUED | OUTPATIENT
Start: 2023-07-17 | End: 2023-07-18

## 2023-07-17 RX ORDER — METHYLERGONOVINE MALEATE 0.2 MG/ML
200 INJECTION INTRAVENOUS ONCE AS NEEDED
Status: DISCONTINUED | OUTPATIENT
Start: 2023-07-17 | End: 2023-07-18 | Stop reason: HOSPADM

## 2023-07-17 RX ORDER — ERYTHROMYCIN 5 MG/G
OINTMENT OPHTHALMIC
Status: ACTIVE
Start: 2023-07-17 | End: 2023-07-18

## 2023-07-17 RX ORDER — FAMOTIDINE 20 MG/1
20 TABLET, FILM COATED ORAL 2 TIMES DAILY PRN
Status: DISCONTINUED | OUTPATIENT
Start: 2023-07-17 | End: 2023-07-18 | Stop reason: HOSPADM

## 2023-07-17 RX ORDER — OXYTOCIN/0.9 % SODIUM CHLORIDE 30/500 ML
999 PLASTIC BAG, INJECTION (ML) INTRAVENOUS ONCE
Status: COMPLETED | OUTPATIENT
Start: 2023-07-17 | End: 2023-07-17

## 2023-07-17 RX ORDER — ONDANSETRON 4 MG/1
4 TABLET, FILM COATED ORAL EVERY 6 HOURS PRN
Status: DISCONTINUED | OUTPATIENT
Start: 2023-07-17 | End: 2023-07-18 | Stop reason: HOSPADM

## 2023-07-17 RX ORDER — TRANEXAMIC ACID 10 MG/ML
1000 INJECTION, SOLUTION INTRAVENOUS ONCE AS NEEDED
Status: DISCONTINUED | OUTPATIENT
Start: 2023-07-17 | End: 2023-07-18

## 2023-07-17 RX ORDER — MAGNESIUM CARB/ALUMINUM HYDROX 105-160MG
30 TABLET,CHEWABLE ORAL ONCE AS NEEDED
Status: DISCONTINUED | OUTPATIENT
Start: 2023-07-17 | End: 2023-07-18 | Stop reason: HOSPADM

## 2023-07-17 RX ORDER — EPHEDRINE SULFATE 50 MG/ML
5 INJECTION, SOLUTION INTRAVENOUS
Status: DISCONTINUED | OUTPATIENT
Start: 2023-07-17 | End: 2023-07-18 | Stop reason: HOSPADM

## 2023-07-17 RX ORDER — ACETAMINOPHEN 325 MG/1
650 TABLET ORAL EVERY 4 HOURS PRN
Status: DISCONTINUED | OUTPATIENT
Start: 2023-07-17 | End: 2023-07-18 | Stop reason: HOSPADM

## 2023-07-17 RX ORDER — TERBUTALINE SULFATE 1 MG/ML
0.25 INJECTION, SOLUTION SUBCUTANEOUS AS NEEDED
Status: DISCONTINUED | OUTPATIENT
Start: 2023-07-17 | End: 2023-07-18 | Stop reason: HOSPADM

## 2023-07-17 RX ORDER — DIPHENHYDRAMINE HYDROCHLORIDE 50 MG/ML
12.5 INJECTION INTRAMUSCULAR; INTRAVENOUS EVERY 8 HOURS PRN
Status: DISCONTINUED | OUTPATIENT
Start: 2023-07-17 | End: 2023-07-18 | Stop reason: HOSPADM

## 2023-07-17 RX ORDER — ONDANSETRON 2 MG/ML
4 INJECTION INTRAMUSCULAR; INTRAVENOUS ONCE AS NEEDED
Status: DISCONTINUED | OUTPATIENT
Start: 2023-07-17 | End: 2023-07-18 | Stop reason: HOSPADM

## 2023-07-17 RX ORDER — FAMOTIDINE 10 MG/ML
20 INJECTION, SOLUTION INTRAVENOUS ONCE AS NEEDED
Status: DISCONTINUED | OUTPATIENT
Start: 2023-07-17 | End: 2023-07-18 | Stop reason: HOSPADM

## 2023-07-17 RX ORDER — MISOPROSTOL 200 UG/1
800 TABLET ORAL ONCE AS NEEDED
Status: DISCONTINUED | OUTPATIENT
Start: 2023-07-17 | End: 2023-07-18 | Stop reason: HOSPADM

## 2023-07-17 RX ORDER — CARBOPROST TROMETHAMINE 250 UG/ML
250 INJECTION, SOLUTION INTRAMUSCULAR
Status: DISCONTINUED | OUTPATIENT
Start: 2023-07-17 | End: 2023-07-18 | Stop reason: HOSPADM

## 2023-07-17 RX ORDER — OXYTOCIN/0.9 % SODIUM CHLORIDE 30/500 ML
250 PLASTIC BAG, INJECTION (ML) INTRAVENOUS CONTINUOUS
Status: DISPENSED | OUTPATIENT
Start: 2023-07-17 | End: 2023-07-17

## 2023-07-17 RX ADMIN — SODIUM CHLORIDE, POTASSIUM CHLORIDE, SODIUM LACTATE AND CALCIUM CHLORIDE 999 ML/HR: 600; 310; 30; 20 INJECTION, SOLUTION INTRAVENOUS at 13:55

## 2023-07-17 RX ADMIN — EPHEDRINE SULFATE 5 MG: 50 INJECTION INTRAVENOUS at 18:14

## 2023-07-17 RX ADMIN — Medication 999 ML/HR: at 22:07

## 2023-07-17 RX ADMIN — SODIUM CHLORIDE, POTASSIUM CHLORIDE, SODIUM LACTATE AND CALCIUM CHLORIDE 999 ML/HR: 600; 310; 30; 20 INJECTION, SOLUTION INTRAVENOUS at 17:54

## 2023-07-17 RX ADMIN — SODIUM CHLORIDE, POTASSIUM CHLORIDE, SODIUM LACTATE AND CALCIUM CHLORIDE 125 ML/HR: 600; 310; 30; 20 INJECTION, SOLUTION INTRAVENOUS at 20:40

## 2023-07-17 RX ADMIN — Medication 125 ML/HR: at 23:03

## 2023-07-17 RX ADMIN — EPHEDRINE SULFATE 5 MG: 50 INJECTION INTRAVENOUS at 19:10

## 2023-07-17 RX ADMIN — SODIUM CHLORIDE, POTASSIUM CHLORIDE, SODIUM LACTATE AND CALCIUM CHLORIDE 125 ML/HR: 600; 310; 30; 20 INJECTION, SOLUTION INTRAVENOUS at 08:51

## 2023-07-17 RX ADMIN — Medication 2 MILLI-UNITS/MIN: at 09:44

## 2023-07-17 RX ADMIN — Medication 250 ML/HR: at 22:21

## 2023-07-17 NOTE — PLAN OF CARE
Problem: Adult Inpatient Plan of Care  Goal: Plan of Care Review  Outcome: Ongoing, Progressing  Flowsheets (Taken 7/17/2023 1727)  Progress: improving  Plan of Care Reviewed With:   patient   spouse   family  Outcome Evaluation: schedueled induction for 0700, SVE 0-1/50%/-3, started pitocin, AROM at 1550, and epiduralized. [place] continue to monitor mom and baby until delivery, will transfer to mother/baby when stable after delivery.  Goal: Patient-Specific Goal (Individualized)  Outcome: Ongoing, Progressing  Flowsheets (Taken 7/17/2023 1727)  Patient-Specific Goals (Include Timeframe): Healthy mom and baby at delivery.  Individualized Care Needs: pain management  Anxieties, Fears or Concerns: none  Goal: Absence of Hospital-Acquired Illness or Injury  Outcome: Ongoing, Progressing  Intervention: Identify and Manage Fall Risk  Recent Flowsheet Documentation  Taken 7/17/2023 1500 by Joey Roper RN  Safety Promotion/Fall Prevention: safety round/check completed  Taken 7/17/2023 1130 by Joey Roper RN  Safety Promotion/Fall Prevention: safety round/check completed  Goal: Optimal Comfort and Wellbeing  Outcome: Ongoing, Progressing  Intervention: Provide Person-Centered Care  Recent Flowsheet Documentation  Taken 7/17/2023 1500 by Joey Roper RN  Trust Relationship/Rapport:   care explained   choices provided   emotional support provided   empathic listening provided   questions answered   questions encouraged   reassurance provided   thoughts/feelings acknowledged  Taken 7/17/2023 1130 by Joey Roper RN  Trust Relationship/Rapport:   care explained   emotional support provided   choices provided   empathic listening provided   questions answered   questions encouraged   reassurance provided   thoughts/feelings acknowledged  Goal: Readiness for Transition of Care  Outcome: Ongoing, Progressing     Problem: Bleeding (Labor)  Goal: Hemostasis  Outcome: Ongoing, Progressing     Problem: Change in Fetal  Wellbeing (Labor)  Goal: Stable Fetal Wellbeing  Outcome: Ongoing, Progressing     Problem: Delayed Labor Progression (Labor)  Goal: Effective Progression to Delivery  Outcome: Ongoing, Progressing     Problem: Infection (Labor)  Goal: Absence of Infection Signs and Symptoms  Outcome: Ongoing, Progressing     Problem: Labor Pain (Labor)  Goal: Acceptable Pain Control  Outcome: Ongoing, Progressing     Problem: Uterine Tachysystole (Labor)  Goal: Normal Uterine Contraction Pattern  Outcome: Ongoing, Progressing     Problem:  Fall Injury Risk  Goal: Absence of Fall, Infant Drop and Related Injury  Outcome: Ongoing, Progressing  Intervention: Promote Injury-Free Environment  Recent Flowsheet Documentation  Taken 2023 1500 by Joey Roper, RN  Safety Promotion/Fall Prevention: safety round/check completed  Taken 2023 1130 by Joey Roper, RN  Safety Promotion/Fall Prevention: safety round/check completed   Goal Outcome Evaluation:  Plan of Care Reviewed With: patient, spouse, family        Progress: improving  Outcome Evaluation: schedueled induction for 0700, SVE 0-1/50%/-3, started pitocin, AROM at 1550, and epiduralized. [place] continue to monitor mom and baby until delivery, will transfer to mother/baby when stable after delivery.

## 2023-07-18 LAB
BASOPHILS # BLD AUTO: 0.04 10*3/MM3 (ref 0–0.2)
BASOPHILS NFR BLD AUTO: 0.3 % (ref 0–1.5)
DEPRECATED RDW RBC AUTO: 41.7 FL (ref 37–54)
EOSINOPHIL # BLD AUTO: 0.01 10*3/MM3 (ref 0–0.4)
EOSINOPHIL NFR BLD AUTO: 0.1 % (ref 0.3–6.2)
ERYTHROCYTE [DISTWIDTH] IN BLOOD BY AUTOMATED COUNT: 12.9 % (ref 12.3–15.4)
HCT VFR BLD AUTO: 31.5 % (ref 34–46.6)
HGB BLD-MCNC: 10.4 G/DL (ref 12–15.9)
IMM GRANULOCYTES # BLD AUTO: 0.06 10*3/MM3 (ref 0–0.05)
IMM GRANULOCYTES NFR BLD AUTO: 0.4 % (ref 0–0.5)
LYMPHOCYTES # BLD AUTO: 1.23 10*3/MM3 (ref 0.7–3.1)
LYMPHOCYTES NFR BLD AUTO: 9.1 % (ref 19.6–45.3)
MCH RBC QN AUTO: 29.8 PG (ref 26.6–33)
MCHC RBC AUTO-ENTMCNC: 33 G/DL (ref 31.5–35.7)
MCV RBC AUTO: 90.3 FL (ref 79–97)
MONOCYTES # BLD AUTO: 0.67 10*3/MM3 (ref 0.1–0.9)
MONOCYTES NFR BLD AUTO: 5 % (ref 5–12)
NEUTROPHILS NFR BLD AUTO: 11.46 10*3/MM3 (ref 1.7–7)
NEUTROPHILS NFR BLD AUTO: 85.1 % (ref 42.7–76)
NRBC BLD AUTO-RTO: 0 /100 WBC (ref 0–0.2)
PLATELET # BLD AUTO: 152 10*3/MM3 (ref 140–450)
PMV BLD AUTO: 10.6 FL (ref 6–12)
RBC # BLD AUTO: 3.49 10*6/MM3 (ref 3.77–5.28)
WBC NRBC COR # BLD: 13.47 10*3/MM3 (ref 3.4–10.8)

## 2023-07-18 PROCEDURE — 85025 COMPLETE CBC W/AUTO DIFF WBC: CPT | Performed by: OBSTETRICS & GYNECOLOGY

## 2023-07-18 RX ORDER — TRAMADOL HYDROCHLORIDE 50 MG/1
50 TABLET ORAL EVERY 6 HOURS PRN
Status: DISCONTINUED | OUTPATIENT
Start: 2023-07-18 | End: 2023-07-19 | Stop reason: HOSPADM

## 2023-07-18 RX ORDER — ACETAMINOPHEN 325 MG/1
650 TABLET ORAL EVERY 6 HOURS PRN
Status: DISCONTINUED | OUTPATIENT
Start: 2023-07-18 | End: 2023-07-19 | Stop reason: HOSPADM

## 2023-07-18 RX ORDER — ONDANSETRON 4 MG/1
4 TABLET, FILM COATED ORAL EVERY 8 HOURS PRN
Status: DISCONTINUED | OUTPATIENT
Start: 2023-07-18 | End: 2023-07-19 | Stop reason: HOSPADM

## 2023-07-18 RX ORDER — BISACODYL 10 MG
10 SUPPOSITORY, RECTAL RECTAL DAILY PRN
Status: DISCONTINUED | OUTPATIENT
Start: 2023-07-18 | End: 2023-07-19 | Stop reason: HOSPADM

## 2023-07-18 RX ORDER — IBUPROFEN 600 MG/1
600 TABLET ORAL EVERY 6 HOURS PRN
Status: DISCONTINUED | OUTPATIENT
Start: 2023-07-18 | End: 2023-07-19 | Stop reason: HOSPADM

## 2023-07-18 RX ORDER — CALCIUM CARBONATE 500 MG/1
2 TABLET, CHEWABLE ORAL 3 TIMES DAILY PRN
Status: DISCONTINUED | OUTPATIENT
Start: 2023-07-18 | End: 2023-07-19 | Stop reason: HOSPADM

## 2023-07-18 RX ORDER — TRANEXAMIC ACID 10 MG/ML
1000 INJECTION, SOLUTION INTRAVENOUS ONCE AS NEEDED
Status: DISCONTINUED | OUTPATIENT
Start: 2023-07-18 | End: 2023-07-19 | Stop reason: HOSPADM

## 2023-07-18 RX ORDER — PRENATAL VIT/IRON FUM/FOLIC AC 27MG-0.8MG
1 TABLET ORAL DAILY
Status: DISCONTINUED | OUTPATIENT
Start: 2023-07-18 | End: 2023-07-19 | Stop reason: HOSPADM

## 2023-07-18 RX ORDER — ONDANSETRON 2 MG/ML
4 INJECTION INTRAMUSCULAR; INTRAVENOUS EVERY 6 HOURS PRN
Status: DISCONTINUED | OUTPATIENT
Start: 2023-07-18 | End: 2023-07-19 | Stop reason: HOSPADM

## 2023-07-18 RX ORDER — HYDROCORTISONE 25 MG/G
CREAM TOPICAL AS NEEDED
Status: DISCONTINUED | OUTPATIENT
Start: 2023-07-18 | End: 2023-07-19 | Stop reason: HOSPADM

## 2023-07-18 RX ORDER — PROMETHAZINE HYDROCHLORIDE 12.5 MG/1
12.5 SUPPOSITORY RECTAL EVERY 6 HOURS PRN
Status: DISCONTINUED | OUTPATIENT
Start: 2023-07-18 | End: 2023-07-19 | Stop reason: HOSPADM

## 2023-07-18 RX ORDER — MISOPROSTOL 200 UG/1
600 TABLET ORAL ONCE AS NEEDED
Status: DISCONTINUED | OUTPATIENT
Start: 2023-07-18 | End: 2023-07-19 | Stop reason: HOSPADM

## 2023-07-18 RX ORDER — METHYLERGONOVINE MALEATE 0.2 MG/ML
200 INJECTION INTRAVENOUS ONCE AS NEEDED
Status: DISCONTINUED | OUTPATIENT
Start: 2023-07-18 | End: 2023-07-19 | Stop reason: HOSPADM

## 2023-07-18 RX ORDER — PROMETHAZINE HYDROCHLORIDE 25 MG/1
25 TABLET ORAL EVERY 6 HOURS PRN
Status: DISCONTINUED | OUTPATIENT
Start: 2023-07-18 | End: 2023-07-19 | Stop reason: HOSPADM

## 2023-07-18 RX ORDER — KETOROLAC TROMETHAMINE 15 MG/ML
15 INJECTION, SOLUTION INTRAMUSCULAR; INTRAVENOUS EVERY 6 HOURS PRN
Status: ACTIVE | OUTPATIENT
Start: 2023-07-18 | End: 2023-07-19

## 2023-07-18 RX ORDER — HYDROXYZINE 50 MG/1
50 TABLET, FILM COATED ORAL NIGHTLY PRN
Status: DISCONTINUED | OUTPATIENT
Start: 2023-07-18 | End: 2023-07-19 | Stop reason: HOSPADM

## 2023-07-18 RX ORDER — DOCUSATE SODIUM 100 MG/1
100 CAPSULE, LIQUID FILLED ORAL 2 TIMES DAILY
Status: DISCONTINUED | OUTPATIENT
Start: 2023-07-18 | End: 2023-07-19 | Stop reason: HOSPADM

## 2023-07-18 RX ORDER — OXYTOCIN/0.9 % SODIUM CHLORIDE 30/500 ML
125 PLASTIC BAG, INJECTION (ML) INTRAVENOUS CONTINUOUS PRN
Status: DISCONTINUED | OUTPATIENT
Start: 2023-07-18 | End: 2023-07-19 | Stop reason: HOSPADM

## 2023-07-18 RX ADMIN — IBUPROFEN 600 MG: 600 TABLET, FILM COATED ORAL at 03:55

## 2023-07-18 RX ADMIN — IBUPROFEN 600 MG: 600 TABLET, FILM COATED ORAL at 20:47

## 2023-07-18 RX ADMIN — IBUPROFEN 600 MG: 600 TABLET, FILM COATED ORAL at 11:40

## 2023-07-18 RX ADMIN — TRAMADOL HYDROCHLORIDE 50 MG: 50 TABLET, COATED ORAL at 08:05

## 2023-07-18 RX ADMIN — Medication: at 00:55

## 2023-07-18 RX ADMIN — Medication 1 TABLET: at 08:05

## 2023-07-18 RX ADMIN — ACETAMINOPHEN 650 MG: 325 TABLET, FILM COATED ORAL at 23:46

## 2023-07-18 RX ADMIN — TRAMADOL HYDROCHLORIDE 50 MG: 50 TABLET, COATED ORAL at 16:30

## 2023-07-18 RX ADMIN — DOCUSATE SODIUM 100 MG: 100 CAPSULE, LIQUID FILLED ORAL at 20:47

## 2023-07-18 NOTE — LACTATION NOTE
"This note was copied from a baby's chart.  P2 term baby, 11 hrs old. Mom breast fed baby after delivery and since has given formula. She has personal pump and encouraged pumping every 3 hrs if desiring good milk supply. She breast fed her first baby \"some\".  "

## 2023-07-18 NOTE — PLAN OF CARE
Problem: Adult Inpatient Plan of Care  Goal: Plan of Care Review  Outcome: Ongoing, Progressing  Flowsheets  Taken 7/18/2023 0457 by Maddie Nova RN  Outcome Evaluation: Pt was unable to void and empty bladder completly after delivery, bladder scanner done and showed over 999ml of urine, I&O cath done and 1200ml of urine drained. Ice applied, Motrin given and pt is now due to void again at 1030. Baby in nsy and pt would like to rest at this time.  Taken 7/17/2023 1727 by Joey Roper RN  Progress: improving  Plan of Care Reviewed With:   patient   spouse   family  Goal: Patient-Specific Goal (Individualized)  Outcome: Ongoing, Progressing  Goal: Absence of Hospital-Acquired Illness or Injury  Outcome: Ongoing, Progressing  Intervention: Identify and Manage Fall Risk  Description: Perform standard risk assessment on admission using a validated tool or comprehensive approach appropriate to the patient; reassess fall risk frequently, with change in status or transfer to another level of care.  Communicate fall injury risk to interprofessional healthcare team.  Determine need for increased observation, equipment and environmental modification, such as low bed, signage and supportive, nonskid footwear.  Adjust safety measures to individual developmental age, stage and identified risk factors.  Reinforce the importance of safety and physical activity with patient and family.  Perform regular intentional rounding to assess need for position change, pain assessment and personal needs, including assistance with toileting.  Recent Flowsheet Documentation  Taken 7/18/2023 0425 by Maddie Nova RN  Safety Promotion/Fall Prevention: safety round/check completed  Taken 7/18/2023 0200 by Maddei Nova RN  Safety Promotion/Fall Prevention: safety round/check completed  Taken 7/18/2023 0100 by Maddie Nova RN  Safety Promotion/Fall Prevention: safety round/check  completed  Taken 7/18/2023 0030 by Maddie Nova RN  Safety Promotion/Fall Prevention: safety round/check completed  Intervention: Prevent and Manage VTE (Venous Thromboembolism) Risk  Description: Assess for VTE (venous thromboembolism) risk.  Encourage and assist with early ambulation.  Initiate and maintain compression or other therapy, as indicated, based on identified risk in accordance with organizational protocol and provider order.  Encourage both active and passive leg exercises while in bed, if unable to ambulate.  Recent Flowsheet Documentation  Taken 7/18/2023 0351 by Maddie Nova RN  Activity Management: ambulated to bathroom  Taken 7/18/2023 0317 by Maddie Nova RN  Activity Management: ambulated to bathroom  Taken 7/18/2023 0200 by Maddie Nova RN  Activity Management: (pt would like to wait to ambulate to bathroom)   activity encouraged   patient refuses activity  Goal: Optimal Comfort and Wellbeing  Outcome: Ongoing, Progressing  Intervention: Monitor Pain and Promote Comfort  Description: Assess pain level, treatment efficacy and patient response at regular intervals using a consistent pain scale.  Consider the presence and impact of preexisting chronic pain.  Encourage patient and caregiver involvement in pain assessment, interventions and safety measures.  Recent Flowsheet Documentation  Taken 7/18/2023 0451 by Maddie Nova RN  Pain Management Interventions:   relaxation techniques promoted   cold applied  Taken 7/18/2023 0351 by Maddie Nova RN  Pain Management Interventions: see MAR  Goal: Readiness for Transition of Care  Outcome: Ongoing, Progressing   Goal Outcome Evaluation:              Outcome Evaluation: Pt was unable to void and empty bladder completly after delivery, bladder scanner done and showed over 999ml of urine, I&O cath done and 1200ml of urine drained. Ice applied, Motrin given  and pt is now due to void again at 1030. Baby in nsy and pt would like to rest at this time.

## 2023-07-18 NOTE — PLAN OF CARE
Problem: Bleeding (Labor)  Goal: Hemostasis  Outcome: Met     Problem: Change in Fetal Wellbeing (Labor)  Goal: Stable Fetal Wellbeing  Outcome: Met     Problem: Delayed Labor Progression (Labor)  Goal: Effective Progression to Delivery  Outcome: Met     Problem: Infection (Labor)  Goal: Absence of Infection Signs and Symptoms  Outcome: Met     Problem: Labor Pain (Labor)  Goal: Acceptable Pain Control  Outcome: Met     Problem: Uterine Tachysystole (Labor)  Goal: Normal Uterine Contraction Pattern  Outcome: Met     Problem:  Fall Injury Risk  Goal: Absence of Fall, Infant Drop and Related Injury  Outcome: Met  Intervention: Promote Injury-Free Environment  Recent Flowsheet Documentation  Taken 2023 by Ester Schaefer, RN  Safety Promotion/Fall Prevention: safety round/check completed  Taken 2023 192 by Ester Schaefer, RN  Safety Promotion/Fall Prevention: safety round/check completed     Problem: Skin Injury Risk Increased  Goal: Skin Health and Integrity  Outcome: Met   Goal Outcome Evaluation:

## 2023-07-18 NOTE — L&D DELIVERY NOTE
[facility]  Vaginal Delivery Note    Patient Name: Kamron Delgado  :  1990  MRN:  [id number]    DX:    Pregnancy      Labor status: Induction of labor     Delivery     Delivery: Vaginal, Spontaneous     YOB: 2021    Time of Birth: 10:05 PM      Anesthesia: Epidural     Delivering clinician: Suleman Whitaker MD       Infant    Findings: Viable male  infant    Infant observations: Weight: 2999 g (6 lb 9.8 oz)        Apgars: 8  @ 1 minute /    9  @ 5 minutes     Placenta, Cord, and Fluid    Placenta delivered  Spontaneous   at  2023 10:07 PM     Cord: 3 vessels  present.   Cord blood obtained: Yes          Repair    Episiotomy: No   Lacerations: 2nd degree     Quantitative Blood Loss:     Quantitative Blood Loss (mL): 238 mL         Delivery narrative: Kamron Delgado is a 32 y.o.  at 39w2d.  Presented  for IOL. Started on pitocin.  Epidural anesthesia. Had  artificial rupture of membranes  at 3:50 PM  on 2023   Progressed normally to C/C/+1 @ 2023  8:51 PM . Fetal status reassuring throughout.  of viable  male  @ 10:05 PM  over an intact perineum. ASDE. Delayed cord clamping performed and infant placed in kangaroo care.   2999 g (6 lb 9.8 oz) .     Apgar :  8  @ 1 minute / 9  @ 5 minutes.  Placenta delivered spontaneously, intact with 3 vessel cord. Cervix and rectum intact. 2nd  degree laceration repaired in usual fashion with 3-0 Monocryl suture. Mother and baby recovering good condition.       Suleman Whitaker MD  23  22:19 EDT    Note done at time of delivery then refreshed at a later date to include RN documentation not yet completed.

## 2023-07-18 NOTE — PROGRESS NOTES
[facility]  Vaginal Delivery Progress Note    Patient Name: Kamron Delgado  :  1990  MRN:  [id number]      Subjective   Postpartum Day 1: Vaginal Delivery of a male infant.     The patient feels well without complaints.  Her pain is well controlled.  Reports normal lochia.     The patient plans to breastfeed.    Objective     Vital Signs Range for the last 24 hours  Temperature: Temp:  [97.7 °F (36.5 °C)-99.1 °F (37.3 °C)] 98 °F (36.7 °C)       BP: BP: ()/(46-99) 107/72   Pulse: Heart Rate:  [] 72   Respirations: Resp:  [16-18] 16                       Physical Exam:  General: Awake and alert  Abdomen: Fundus: firm, non tender, 1 below umbilicus  Extremities:  trace edema, NT     Labs:     Results from last 7 days   Lab Units 23  0753 23  0850   WBC 10*3/mm3 13.47* 8.92   HEMOGLOBIN g/dL 10.4* 11.8*   HEMATOCRIT % 31.5* 36.5   PLATELETS 10*3/mm3 152 171       Prenatal labs results reviewed:  Yes   Rubella:  Immune  Rh Status:    RH type   Date Value Ref Range Status   2023 Positive  Final         Assessment & Plan  : 1. PPD1 S/P  - Doing well, continue usual cares.   Desires circ.          Pregnancy          Cheryl Vasquez, MALACHI  2023  13:17 EDT   Desires circ. All questions answered.  Patient seen and examined. Agree with above assessment.     Kathleen Elise MD  2023  14:21 EDT

## 2023-07-19 VITALS
OXYGEN SATURATION: 100 % | BODY MASS INDEX: 26.51 KG/M2 | TEMPERATURE: 98 F | WEIGHT: 140.4 LBS | DIASTOLIC BLOOD PRESSURE: 77 MMHG | HEART RATE: 80 BPM | RESPIRATION RATE: 16 BRPM | SYSTOLIC BLOOD PRESSURE: 111 MMHG | HEIGHT: 61 IN

## 2023-07-19 RX ORDER — IBUPROFEN 600 MG/1
600 TABLET ORAL EVERY 6 HOURS PRN
Qty: 30 TABLET | Refills: 0 | Status: SHIPPED | OUTPATIENT
Start: 2023-07-19

## 2023-07-19 RX ORDER — TRAMADOL HYDROCHLORIDE 50 MG/1
50 TABLET ORAL EVERY 6 HOURS PRN
Qty: 12 TABLET | Refills: 0 | Status: SHIPPED | OUTPATIENT
Start: 2023-07-19 | End: 2023-07-25

## 2023-07-19 RX ADMIN — DOCUSATE SODIUM 100 MG: 100 CAPSULE, LIQUID FILLED ORAL at 08:11

## 2023-07-19 RX ADMIN — Medication 1 TABLET: at 08:11

## 2023-07-19 RX ADMIN — IBUPROFEN 600 MG: 600 TABLET, FILM COATED ORAL at 12:23

## 2023-07-19 RX ADMIN — TRAMADOL HYDROCHLORIDE 50 MG: 50 TABLET, COATED ORAL at 08:11

## 2023-07-19 NOTE — DISCHARGE SUMMARY
Date of Discharge:  2023    Discharge Diagnosis:     Presenting Problem/History of Present Illness  Pregnancy [Z34.90]  Spontaneous vaginal delivery [O80]       Hospital Course  Patient is a 32 y.o. female presented with IOL with  of viable male infant.      Procedures Performed         Consults:   Consults       No orders found from 2023 to 2023.            Condition on Discharge:   Subjective   Postpartum Day 2 Vaginal Delivery.    The patient feels well without complaints.    Vital Signs  Temp:  [98 °F (36.7 °C)-98.6 °F (37 °C)] 98 °F (36.7 °C)  Heart Rate:  [72-85] 80  Resp:  [16] 16  BP: (100-112)/(66-77) 111/77    Physical Exam:   General: Awake and alert   Abdomen: Fundus: firm, non tender    Extremities:  Calves NT bilaterally    Assessment & Plan     PPD2  S/P  -   Stable for discharge. Instructions reviewed      Discharge Disposition  Home or Self Care    Discharge Medications     Discharge Medications        New Medications        Instructions Start Date   ibuprofen 600 MG tablet  Commonly known as: ADVIL,MOTRIN   600 mg, Oral, Every 6 Hours PRN      traMADol 50 MG tablet  Commonly known as: ULTRAM   50 mg, Oral, Every 6 Hours PRN             Continue These Medications        Instructions Start Date   magnesium oxide 400 tablet tablet  Commonly known as: MAG-OX   400 mg, Oral, Daily      prenatal (CLASSIC) vitamin  tablet  Generic drug: prenatal vitamin   Oral, Daily                 The patient has been prescribed a controlled substance.  She has been counseled on the risks associated with using the medication.   The addictive potential of this medication and alternatives were discussed carefully with this patient and she demonstrated understanding.  A JONATHAN report has been obtained and reviewed.       Activity at Discharge: restrictions reviewed    Follow-up Appointments  No future appointments.  Additional Instructions for the Follow-ups that You Need to Schedule       Call MD  With Problems / Concerns   As directed      Call if increased pain, bleeding, or fever. Call if soaking a pad an hour or fist size clots. Call if signs of mastitis- redness, pain, fever. Nothing in the vagina for 6 weeks. No driving while on narcotic, no driving for first week.    Order Comments: Call if increased pain, bleeding, or fever. Call if soaking a pad an hour or fist size clots. Call if signs of mastitis- redness, pain, fever. Nothing in the vagina for 6 weeks. No driving while on narcotic, no driving for first week.          Discharge Follow-up with Specified Provider: [facility]; 2 Weeks   As directed      To: [facility]    Follow Up: 2 Weeks                 Test Results Pending at Discharge    PP anemia- asymptomatic- OTC 65 mg iron every other day     Cayla Adam, APRN  07/19/23  08:58 EDT

## 2023-07-19 NOTE — PLAN OF CARE
Goal Outcome Evaluation:  Plan of Care Reviewed With: patient, spouse        Progress: improving  Outcome Evaluation: VSS. Ambulating independently. Voiding spontaneously. Minimal complaints of pain, treating with PRN Motrin and Tylenol. Breastfeeding and supplementing with formula.

## 2023-07-31 NOTE — H&P
Pt admitted for IOL@ 39 weeks. Antepartum care uncomplicated.   H&P reviewed. The patient was examined and there are no changes to the H&P.    Suleman Whitaker MD  7/31/2023  18:21 EDT       
Fair

## 2024-10-16 ENCOUNTER — HOSPITAL ENCOUNTER (EMERGENCY)
Facility: HOSPITAL | Age: 34
Discharge: HOME | End: 2024-10-16
Attending: STUDENT IN AN ORGANIZED HEALTH CARE EDUCATION/TRAINING PROGRAM | Admitting: STUDENT IN AN ORGANIZED HEALTH CARE EDUCATION/TRAINING PROGRAM
Payer: COMMERCIAL

## 2024-10-16 VITALS
HEART RATE: 68 BPM | DIASTOLIC BLOOD PRESSURE: 68 MMHG | OXYGEN SATURATION: 100 % | SYSTOLIC BLOOD PRESSURE: 112 MMHG | TEMPERATURE: 98.3 F | RESPIRATION RATE: 16 BRPM

## 2024-10-16 DIAGNOSIS — T78.40XA ALLERGIC REACTION, INITIAL ENCOUNTER: Primary | ICD-10-CM

## 2024-10-16 PROCEDURE — 99282 EMERGENCY DEPT VISIT SF MDM: CPT

## 2024-10-16 RX ORDER — FAMOTIDINE 40 MG/1
40 TABLET, FILM COATED ORAL 2 TIMES DAILY
Qty: 14 TABLET | Refills: 0 | Status: SHIPPED | OUTPATIENT
Start: 2024-10-16 | End: 2024-10-23

## 2024-10-16 NOTE — ED PROVIDER NOTES
EMERGENCY DEPARTMENT ENCOUNTER    Room Number:  42/42  Date seen:  10/16/2024  PCP: Levi Martinez MD  Historian/Independent historian:   Chronic or social conditions impacting care: n/a      HPI:  Chief Complaint: allergic reaction   A complete HPI/ROS/PMH/PSH/SH/FH are unobtainable due to: n/a  Context: Kamron Delgado is a 33 y.o. female who presents to the ED c/o allergic reaction.  Patient states that about 3 weeks ago she got new glasses and began having itching to her bilateral ears.  She states that she then developed a rash on her cheeks that worsened last night.  She states that she has not worn the new glasses since Sunday but continues to have symptoms.  She was seen at the urgent care today and was prescribed a Medrol Dosepak and hydroxyzine, but her  at bedside is requesting blood work to assess what could be causing this reaction.  I discussed with patient and spouse that we would be unable to do allergy testing through the ER but would give them follow-up and will continue to treat symptomatically.  No cough, shortness of breath, changes in voice, difficulty speaking or swallowing.  No known allergies.    External Medical record review:   10/16/2024: Urgent care visit prescribed hydroxyzine and Medrol Dosepak      PAST MEDICAL HISTORY  Active Ambulatory Problems     Diagnosis Date Noted    Pregnancy 03/08/2019    Postpartum anemia 03/11/2019     Resolved Ambulatory Problems     Diagnosis Date Noted    No Resolved Ambulatory Problems     No Additional Past Medical History         PAST SURGICAL HISTORY  Past Surgical History:   Procedure Laterality Date    WISDOM TOOTH EXTRACTION           FAMILY HISTORY  Family History   Problem Relation Age of Onset    Lung cancer Father     Breast cancer Mother          SOCIAL HISTORY  Social History     Socioeconomic History    Marital status:      Spouse name: Devon Delgado    Number of children: 1   Tobacco Use    Smoking status:  Never     Passive exposure: Never    Smokeless tobacco: Never   Vaping Use    Vaping status: Never Used   Substance and Sexual Activity    Alcohol use: No    Drug use: No    Sexual activity: Yes     Partners: Male         ALLERGIES  Patient has no known allergies.        REVIEW OF SYSTEMS  Per HPI, otherwise negative.       PHYSICAL EXAM  ED Triage Vitals   Temp Heart Rate Resp BP SpO2   10/16/24 1545 10/16/24 1545 10/16/24 1545 10/16/24 1547 10/16/24 1545   98.3 °F (36.8 °C) 76 16 131/79 100 %      Temp src Heart Rate Source Patient Position BP Location FiO2 (%)   -- -- 10/16/24 1547 10/16/24 1547 --     Sitting Right arm        Physical Exam  Vitals and nursing note reviewed.   Constitutional:       Appearance: Normal appearance.   HENT:      Head: Normocephalic and atraumatic.      Right Ear: There is impacted cerumen.      Left Ear: There is impacted cerumen.      Mouth/Throat:      Comments: No angioedema  Eyes:      Extraocular Movements: Extraocular movements intact.      Conjunctiva/sclera: Conjunctivae normal.      Pupils: Pupils are equal, round, and reactive to light.   Cardiovascular:      Rate and Rhythm: Normal rate and regular rhythm.      Pulses: Normal pulses.      Heart sounds: Normal heart sounds.   Pulmonary:      Effort: Pulmonary effort is normal.      Breath sounds: Normal breath sounds. No stridor. No wheezing.   Skin:     Findings: Rash present.      Comments: Mild edema and erythematous rash noted to bilateral cheeks   Neurological:      Mental Status: She is alert and oriented to person, place, and time. Mental status is at baseline.               LAB RESULTS  No results found for this or any previous visit (from the past 24 hours).    Ordered the above labs and reviewed the results.        RADIOLOGY  No Radiology Exams Resulted Within Past 24 Hours    Ordered the above noted radiological studies. Reviewed by me in PACS.        MEDICATIONS GIVEN IN ER  Medications - No data to  display        MEDICAL DECISION MAKING, PROGRESS, and CONSULTS    All labs have been independently reviewed by me.  All radiology studies have been reviewed by me and I have also reviewed the radiology report.   EKG's independently viewed and interpreted by me.  Discussion below represents my analysis of pertinent findings related to patient's condition, differential diagnosis, treatment plan and final disposition.    33-year-old female who presents with worsening rash to face.  No known allergies.  Was seen by urgent care and prescribed steroids and hydroxyzine.  I did add on Pepcid and reassured patient and spouse that this is the appropriate course of treatment at this time.  We discussed strict return precautions.  I also gave them referral per the request for additional allergy testing.          Shared decision making/consideration for admission: Stable for outpatient follow-up      Orders placed during this visit:  Orders Placed This Encounter   Procedures    Continuous Pulse Oximetry         Differential diagnosis include, but not limited to: Contact dermatitis, allergic reaction      Additional orders considered but not ordered: Solu-Medrol    Independent interpretation of labs, radiology studies, and discussions with consultants:    Discussed with Dr. Shay, who agrees with plan.     ED Course as of 10/16/24 1636   Wed Oct 16, 2024   1634 Updated patient on ED workup, including labs and imaging (if performed). We discussed follow up instructions and return precautions. The patient verbalizes understanding and is ready for discharge. I also instructed the patient to follow up with Allergy and Asthma.  [JG]      ED Course User Index  [JG] Salome Rebolledo APRN             DIAGNOSIS  Final diagnoses:   Allergic reaction, initial encounter         DISPOSITION  discharge      Latest Documented Vital Signs:  As of 16:36 EDT  BP- 131/79 HR- 76 Temp- 98.3 °F (36.8 °C) O2 sat- 100%              --    Please  note that portions of this were completed with a voice recognition program.       Note Disclaimer: At [facility], we believe that sharing information builds trust and better relationships. You are receiving this note because you are receiving care at [facility] or recently visited. It is possible you will see health information before a provider has talked with you about it. This kind of information can be easy to misunderstand. To help you fully understand what it means for your health, we urge you to discuss this note with your provider.             Salome Rebolledo, APRN  10/16/24 1297

## 2024-10-16 NOTE — DISCHARGE INSTRUCTIONS
Take medications you were prescribed at the Urgent Care today and add the Pepcid I sent in for you  Mild soap and water  Follow up with Allergy and Asthma as needed for additional testing  Return for increased swelling, shortness of breath, any new or worsening symptoms

## 2024-10-16 NOTE — ED PROVIDER NOTES
MD ATTESTATION NOTE    The TONE and I have discussed this patient's history, physical exam, MDM, and treatment plan.  I have reviewed the documentation and personally had a face to face interaction with the patient. I affirm the documentation and agree with the treatment and plan.  The attached note describes my personal findings.      I provided a substantive portion of the medical decision making.        Brief HPI: 33-year-old female, no significant past medical history, presents ED for evaluation of several days of worsening facial redness swelling and itching.  Patient states she got a new pair glasses and has began to have this reaction since then.  She has not worn them for the past 2 days but the itching is worsening.  She went to an urgent care earlier today where they started her on steroids.    PHYSICAL EXAM  ED Triage Vitals   Temp Heart Rate Resp BP SpO2   10/16/24 1545 10/16/24 1545 10/16/24 1545 10/16/24 1547 10/16/24 1545   98.3 °F (36.8 °C) 76 16 131/79 100 %      Temp src Heart Rate Source Patient Position BP Location FiO2 (%)   -- -- 10/16/24 1547 10/16/24 1547 --     Sitting Right arm          GENERAL: no acute distress  HENT: nares patent  EYES: no scleral icterus, no conjunctival injection, no oropharyngeal edema  CV: regular rhythm, normal rate  RESPIRATORY: normal effort  ABDOMEN: soft  MUSCULOSKELETAL: no deformity  NEURO: alert, moves all extremities, follows commands  PSYCH:  calm, cooperative  SKIN: warm, dry, mild erythema and edema to bilateral cheeks    Vital signs and nursing notes reviewed.        Medical Decision Making:  ED Course as of 10/16/24 1644   Wed Oct 16, 2024   1634 Updated patient on ED workup, including labs and imaging (if performed). We discussed follow up instructions and return precautions. The patient verbalizes understanding and is ready for discharge. I also instructed the patient to follow up with Allergy and Asthma.  [JG]      ED Course User Index  [JG]  Salome Rebolledo APRN Nichols, Dylan J, MD  10/16/24 2545